# Patient Record
Sex: MALE | Race: WHITE | Employment: OTHER | URBAN - METROPOLITAN AREA
[De-identification: names, ages, dates, MRNs, and addresses within clinical notes are randomized per-mention and may not be internally consistent; named-entity substitution may affect disease eponyms.]

---

## 2020-10-09 ENCOUNTER — HOSPITAL ENCOUNTER (OUTPATIENT)
Dept: CARDIAC REHAB | Age: 75
Discharge: HOME | End: 2020-10-09
Payer: MEDICARE

## 2020-10-09 VITALS — BODY MASS INDEX: 31.32 KG/M2 | WEIGHT: 206 LBS

## 2020-10-09 PROCEDURE — 93797 PHYS/QHP OP CAR RHAB WO ECG: CPT

## 2020-10-09 PROCEDURE — 93798 PHYS/QHP OP CAR RHAB W/ECG: CPT

## 2020-10-09 RX ORDER — LISINOPRIL AND HYDROCHLOROTHIAZIDE 10; 12.5 MG/1; MG/1
1 TABLET ORAL DAILY
COMMUNITY
End: 2022-04-15 | Stop reason: DRUGHIGH

## 2020-10-09 NOTE — PROGRESS NOTES
INITIAL CARDIAC ITP FOR REVIEW AND SIGNATURE    Cardiac Rehab Initial Treatment Note/Plan    Bita Alexander 76 y.o. presented to cardiac rehab for an intake and a six minute walk test today with a primary diagnosis of CABG x4. Patient's EF is 38%. Bita Alexander has a history of Hypertension, Hyperlipidemia, Coronary artery disease, Obesity and status post CABG. Cardiac risk factors include smoking/ tobacco exposure, dyslipidemia, obesity, hypertension and these were reviewed with patient. Bita Alexander is  and lives with spouse. PHQ-9, depression score, is 4 and this is considered to benormal. The result was discussed with patient who confirms score to be accurate. Patient denied chest pain or SOB during 6 minute walk and the cardiac rhythm was in Sinus Tachycardia. Bita Alexander will attend exercise and educational sessions 3 days a week in cardiac rehab for 36 sessions. Goals for Rehab:    Patient name: Bita Alexander : 1945       Goals Comments   1.  Increase endurance so that he can play golf again   [x] initial  [] met                  [] not met  [] progressing Increase peak METS from 1.8 to 2.3 by next recert       Manisha Yost RN 10/9/2020 4:18 PM    Cardiac ITP      CR INTAKE from 10/9/2020 in [facility]    Treatment Diagnosis    Treatment Diagnosis 1  CABG    CABG Date  20    Treatment Diagnosis 2  NSTEMI    NSTEMI Date  20    Referral Date  10/08/20    Individual Treatment Plan    ITP Visit Type  Initial Assessment    1st Date of Exercise   10/09/20    ITP Next Review Date  20    Visit #/Total Visits      EF %  38 %    Risk Stratification  High    ITP  Exercise, Psychosocial, Tobacco, Nutrition, Education    Exercise     DASI Total Score  12.75    Assisted Devices  None    Total Score  0    Test  Six minute walk test    Exercise Prescription    Mode  Treadmill, Bike, Stepper, Ergometer    Frequency per week  2-3    Duration per session  35-55    Intensity  METS        1.8    RPE  11-13    Progression  increase peak METS to 3.0    Target Heart Rate      Resistance Training  No    Exercise Blood Pressures    Resting BP  104/61    Peak BP  126/80    Is BP WDL?   Yes    Exercise Activity at Home    Type  walking    Frequency  3-4    Duration  20 minutes    Resistance Training  No    Exercise Education    Education  Exercise safety, Signs/Symptoms to report, RPE scale, Equipment orientation    Exercise Target Goal    Target Goal(s)  Individual exercise RX, Aerobic activity 30 + minutes/day  5 days/week    Psychosocial    Stages of Change  Preparation    [facility] Total Score  18    PHQ 9 Score  4    Psychosocial Intervention    Interventions  No intervention indicated    Currently Taking Psychotropic Meds  No    Medication Changes  No    Psychosocial Education    Education  Impact self care behaviors on health    Psychosocial Target Goals    Target Goal(s)  Engages in self-care behaviors    Uses Stress Mgmt Techniques  Yes    Nutrition    Stages of Change  Preparation    Diabetes  No    Lipids    Date Lipids Drawn  09/04/20    Total  156    HDL  42    LDL  84    Triglycerides  150    Lipid Med(s)  Lipitor    Lipid Med Change(s)  No    Weight Management    Weight   93.4 kg (206 lb)    Waist Circumference   43    Alcohol  Weekly    Type  beer    Amount  1-2    Rate Your Plate Total Score  53    Nutrition Intervention    Dietitian Consult  No    Nurse/Patient Discussion  Yes    Nutrition Class  Yes    Diabetes Education Referral  No    Lipid Clinic Referral  No    Weight Management Referral  No    Nutrition Education    Education  Low sodium diet, Healthy eating, Carb-controlled diet, Low fat & cholesterol diet    Nutrition Target Goals    Target Goals  LDL-C less than 70 for high risk, BMI less than 25, Waist size less than 40 inches males and less than 35 inches for females, Weight loss of 5-10%    Education    Learning Barrier  Ready to learn    Education Intervention    Patient Education     Education  Risk factors, CAD, Med Compliance, Signs/Symptoms of Angina    Hypertension  Yes    Hypertension Controlled  Yes    Is BP WDL?   Yes    Med(s) Change  No    BP Meds  Lisinopril    Education Target Goals    Target Goals  Medication compliance, Understand target guidelines for lipids, Understand target guidelines for B/P, Risk factors    Physician Response    Exercise      CR INTAKE from 10/9/2020 [facility]    Any problems changes since your last visit  Other (comment)  [initial visit]    Any symptoms while exercising  denies    Psychosocial/Stress Level  0    Resting EKG rhythm  SR    Tobacco Use  None    ITP Next Review Date  11/08/20    Visit Number/Total Visits  1/36    What is plan for next session  start exercise Rx    On Call Medical Director Immediately Available  Lira    Target Heart Rate(Range)      Resting HR  81    Resting BP  104/61    Recovery HR  85    Recovery BP  111/75    Weight  93.4 kg (206 lb)    Exercise EKG Rhythm  SR/ST    Exercise Duration  6    Peak HR  101    Peak BP  126/80    Peak RPE  11    Peak Mets  1.8    Asymptomatic  yes    O2 Saturation  98    Total Minutes  6

## 2020-10-13 ENCOUNTER — HOSPITAL ENCOUNTER (OUTPATIENT)
Dept: CARDIAC REHAB | Age: 75
Discharge: HOME | End: 2020-10-13
Payer: MEDICARE

## 2020-10-13 VITALS — WEIGHT: 208 LBS | BODY MASS INDEX: 31.63 KG/M2

## 2020-10-13 PROCEDURE — 93798 PHYS/QHP OP CAR RHAB W/ECG: CPT

## 2020-10-14 ENCOUNTER — HOSPITAL ENCOUNTER (OUTPATIENT)
Dept: CARDIAC REHAB | Age: 75
Discharge: HOME | End: 2020-10-14
Payer: MEDICARE

## 2020-10-14 VITALS — BODY MASS INDEX: 31.17 KG/M2 | WEIGHT: 205 LBS

## 2020-10-14 PROCEDURE — 93798 PHYS/QHP OP CAR RHAB W/ECG: CPT

## 2020-10-16 ENCOUNTER — HOSPITAL ENCOUNTER (OUTPATIENT)
Dept: CARDIAC REHAB | Age: 75
Discharge: HOME | End: 2020-10-16
Payer: MEDICARE

## 2020-10-16 VITALS — WEIGHT: 205 LBS | BODY MASS INDEX: 31.17 KG/M2

## 2020-10-16 PROCEDURE — 93798 PHYS/QHP OP CAR RHAB W/ECG: CPT

## 2020-10-16 PROCEDURE — 93797 PHYS/QHP OP CAR RHAB WO ECG: CPT

## 2020-10-19 ENCOUNTER — HOSPITAL ENCOUNTER (OUTPATIENT)
Dept: CARDIAC REHAB | Age: 75
Discharge: HOME | End: 2020-10-19
Payer: MEDICARE

## 2020-10-26 ENCOUNTER — TELEPHONE (OUTPATIENT)
Dept: CARDIAC REHAB | Age: 75
End: 2020-10-26

## 2020-10-26 NOTE — TELEPHONE ENCOUNTER
Cardiac Rehab called patient and left a message to check in. Additional attempts at contact will be made.     Thank you,  Georgina Beltran

## 2020-10-29 ENCOUNTER — TELEPHONE (OUTPATIENT)
Dept: CARDIAC REHAB | Age: 75
End: 2020-10-29

## 2020-10-29 NOTE — TELEPHONE ENCOUNTER
Cardiac Rehab called patient and spoke to his wife, Mrs. Mark Valenzuela. She said patient still is having terrible foot pain and that the antibiotic has helped a little, but not much. Antibiotic runs out tomorrow and she is waiting for a call back as to what the next step is. She said patient is not scheduled to see the doctor again until 11/10/2020. She will call to update us when they have more information.     Thank you,  Jonathan Patterson

## 2020-10-30 ENCOUNTER — APPOINTMENT (OUTPATIENT)
Dept: CARDIAC REHAB | Age: 75
End: 2020-10-30
Payer: MEDICARE

## 2020-11-02 ENCOUNTER — APPOINTMENT (OUTPATIENT)
Dept: CARDIAC REHAB | Age: 75
End: 2020-11-02

## 2020-11-04 ENCOUNTER — APPOINTMENT (OUTPATIENT)
Dept: CARDIAC REHAB | Age: 75
End: 2020-11-04

## 2020-11-06 ENCOUNTER — APPOINTMENT (OUTPATIENT)
Dept: CARDIAC REHAB | Age: 75
End: 2020-11-06

## 2020-11-06 NOTE — PROGRESS NOTES
CARDIAC ITP REASSESSMENT FOR REVIEW AND SIGNATURE       Patient name: Efrain Pinto : 1945     Visits from Start of Care: 4      Reporting Period: 10/9 to     Subjective Reports: leg pain and swelling, hoping to be back in rehab soon   Goals Comments   1. Increase endurance so that he can play golf again      [] met                  [] not met  [x] progressing Increase peak METS from 4.9 to 5.0 by next recert      Key functional changes: none this recert      Problems/ barriers to goal attainment: leg swelling and pain, pt. has been out of rehab    Assessment / Recommendations:Continue w/ cardiac rehab when able to    Brittani Moon RN 2020 3:06 PM    Cardiac ITP      CR PHASE II from 10/16/2020 in [facility]    Treatment Diagnosis 1  CABG    CABG Date  20    Treatment Diagnosis 2  NSTEMI    NSTEMI Date  20    Referral Date  10/08/20    ITP Visit Type  Re-Assessment    1st Date of Exercise   10/09/20    ITP Next Review Date  20    Visit #/Total Visits      EF %  38 %    Risk Stratification  High    ITP  Exercise, Psychosocial, Tobacco, Nutrition, Education    Stages of Change  Maintenance    DASI Total Score      Assisted Devices  None    Total Score      Test      Mode  Treadmill, Bike, Stepper, Ergometer    Frequency per week  2-3    Duration per session  35-55    Intensity  METS        1.8-4.9    RPE  11-13    Progression  increase peak METS to 3.0    Target Heart Rate      Resistance Training  No    Resting BP  111/77    Peak BP  119/72    Is BP WDL?   Yes    Type  walking    Frequency  3-4    Duration  20 minutes    Resistance Training  No    Education  Exercise safety, Signs/Symptoms to report, RPE scale, Equipment orientation    Target Goal(s)  Individual exercise RX, Aerobic activity 30 + minutes/day  5 days/week    Stages of Change  Maintenance    [facility] Total Score      PHQ 9 Score      Interventions      Currently Taking Psychotropic Meds   Medication Changes  No    Education  Impact self care behaviors on health    Target Goal(s)  Engages in self-care behaviors    Uses Stress Mgmt Techniques  Yes    Stages of Change  Maintenance    Diabetes  No    Date Lipids Drawn  09/04/20    Total  156    HDL  42    LDL  84    Triglycerides  150    Lipid Med(s)  Lipitor    Lipid Med Change(s)  No    Weight   93 kg (205 lb)    Waist Circumference   43    Alcohol  Weekly    Type  beer    Amount  1-2    Rate Your Plate Total Score      Dietitian Consult  No    Nurse/Patient Discussion  Yes    Nutrition Class  Yes    Diabetes Education Referral  No    Lipid Clinic Referral  No    Weight Management Referral  No    Education      Target Goals  LDL-C less than 70 for high risk, BMI less than 25, Waist size less than 40 inches males and less than 35 inches for females, Weight loss of 5-10%    Learning Barrier  Ready to learn    Education  Risk factors, CAD, Med Compliance, Signs/Symptoms of Angina    Hypertension  Yes    Hypertension Controlled  Yes    Is BP WDL?   Yes    Med(s) Change  No    BP Meds  Lisinopril    Target Goals  Medication compliance, Understand target guidelines for lipids, Understand target guidelines for B/P, Risk factors    Exercise      CR PHASE II from 10/16/2020 in [facility]    Any problems changes since your last visit  Denies    Any symptoms while exercising  denies    Psychosocial/Stress Level  0    Resting EKG rhythm  SR    Tobacco Use  None    ITP Next Review Date  12/06/20    Visit Number/Total Visits  5/36  [education class (risky business)]    What is plan for next session      On Call Medical Director Immediately Available  Chary    Target Heart Rate(Range)      Resting HR  88    Resting BP  111/77    Recovery HR  81    Recovery BP  119/72    Weight  93 kg (205 lb)    Exercise EKG Rhythm  SR/ST w/rare PVCs    Exercise Duration  60    Peak HR  113    Peak BP      Peak RPE  15    Peak Mets  4.9    Asymptomatic  yes    O2 Saturation      Total Minutes  60

## 2020-11-09 ENCOUNTER — APPOINTMENT (OUTPATIENT)
Dept: CARDIAC REHAB | Age: 75
End: 2020-11-09

## 2020-11-11 ENCOUNTER — APPOINTMENT (OUTPATIENT)
Dept: CARDIAC REHAB | Age: 75
End: 2020-11-11

## 2020-11-13 ENCOUNTER — TELEPHONE (OUTPATIENT)
Dept: CARDIAC REHAB | Age: 75
End: 2020-11-13

## 2020-11-13 NOTE — TELEPHONE ENCOUNTER
Cardiac Rehab called patient and spoke to him about returning to rehab. He stated that he went to his PCP yesterday. According to patient, his PCP put him on medication for a gout flare up. Will follow up in about a week to see if medication helped with ankle swelling.     Thank you,  Dm Arias

## 2020-11-20 ENCOUNTER — APPOINTMENT (OUTPATIENT)
Dept: CARDIAC REHAB | Age: 75
End: 2020-11-20

## 2020-11-30 ENCOUNTER — TELEPHONE (OUTPATIENT)
Dept: CARDIAC REHAB | Age: 75
End: 2020-11-30

## 2020-11-30 NOTE — TELEPHONE ENCOUNTER
Cardiac Rehab received a call from patient's wife stating that he fell today. He is okay, but is going to see his doctor to make sure. He will be out this week but will return next week.     Thank you,  Roland Hawthoren

## 2020-12-01 ENCOUNTER — APPOINTMENT (OUTPATIENT)
Dept: CARDIAC REHAB | Age: 75
End: 2020-12-01
Payer: MEDICARE

## 2020-12-03 ENCOUNTER — APPOINTMENT (OUTPATIENT)
Dept: CARDIAC REHAB | Age: 75
End: 2020-12-03
Payer: MEDICARE

## 2020-12-04 NOTE — PROGRESS NOTES
CARDIAC ITP REASSESSMENT FOR REVIEW AND SIGNATURE         Patient name: Sunny Conner : 1945      Visits from Start of Care: 4                                        Reporting Period:  to      Subjective Reports: leg pain and swelling, hoping to be back in rehab soon        Goals Comments   1. Increase endurance so that he can play golf again       []? met                      []? not met  [x]? progressing Increase peak METS from 4.9 to 5.0 by next recert       Key functional changes: none this recert       Problems/ barriers to goal attainment: leg swelling and pain, pt. has been out of rehab, hoping to return next week     Assessment / Recommendations:Continue w/ cardiac rehab when able to     Bentley Jang RN 2020 8:10 AM     Cardiac ITP        CR PHASE II from 10/16/2020 in [facility]    Treatment Diagnosis 1  CABG    CABG Date  20    Treatment Diagnosis 2  NSTEMI    NSTEMI Date  20    Referral Date  10/08/20    ITP Visit Type  Re-Assessment    1st Date of Exercise   10/09/20    ITP Next Review Date  20    Visit #/Total Visits      EF %  38 %    Risk Stratification  High    ITP  Exercise, Psychosocial, Tobacco, Nutrition, Education    Stages of Change  Maintenance    DASI Total Score      Assisted Devices  None    Total Score      Test      Mode  Treadmill, Bike, Stepper, Ergometer    Frequency per week  2-3    Duration per session  35-55    Intensity  METS        1.8-4.9    RPE  11-13    Progression  increase peak METS to 3.0    Target Heart Rate      Resistance Training  No    Resting BP  111/77    Peak BP  119/72    Is BP WDL?   Yes    Type  walking    Frequency  3-4    Duration  20 minutes    Resistance Training  No    Education  Exercise safety, Signs/Symptoms to report, RPE scale, Equipment orientation    Target Goal(s)  Individual exercise RX, Aerobic activity 30 + minutes/day  5 days/week    Stages of Change  Maintenance    [facility] Score      PHQ 9 Score      Interventions      Currently Taking Psychotropic Meds      Medication Changes  No    Education  Impact self care behaviors on health    Target Goal(s)  Engages in self-care behaviors    Uses Stress Mgmt Techniques  Yes    Stages of Change  Maintenance    Diabetes  No    Date Lipids Drawn  09/04/20    Total  156    HDL  42    LDL  84    Triglycerides  150    Lipid Med(s)  Lipitor    Lipid Med Change(s)  No    Weight   93 kg (205 lb)    Waist Circumference   43    Alcohol  Weekly    Type  beer    Amount  1-2    Rate Your Plate Total Score      Dietitian Consult  No    Nurse/Patient Discussion  Yes    Nutrition Class  Yes    Diabetes Education Referral  No    Lipid Clinic Referral  No    Weight Management Referral  No    Education      Target Goals  LDL-C less than 70 for high risk, BMI less than 25, Waist size less than 40 inches males and less than 35 inches for females, Weight loss of 5-10%    Learning Barrier  Ready to learn    Education  Risk factors, CAD, Med Compliance, Signs/Symptoms of Angina    Hypertension  Yes    Hypertension Controlled  Yes    Is BP WDL?   Yes    Med(s) Change  No    BP Meds  Lisinopril    Target Goals  Medication compliance, Understand target guidelines for lipids, Understand target guidelines for B/P, Risk factors    Exercise        CR PHASE II from 10/16/2020 in [facility]    Any problems changes since your last visit  Denies    Any symptoms while exercising  denies    Psychosocial/Stress Level  0    Resting EKG rhythm  SR    Tobacco Use  None    ITP Next Review Date  12/06/20    Visit Number/Total Visits  5/36  [education class (risky business)]    What is plan for next session      On Call Medical Director Immediately Available  Chary    Target Heart Rate(Range)      Resting HR  88    Resting BP  111/77    Recovery HR  81    Recovery BP  119/72    Weight  93 kg (205 lb)    Exercise EKG Rhythm  SR/ST w/rare PVCs    Exercise Duration  60 Peak HR  113    Peak BP      Peak RPE  15    Peak Mets  4.9    Asymptomatic  yes    O2 Saturation      Total Minutes  60

## 2020-12-08 ENCOUNTER — TELEPHONE (OUTPATIENT)
Dept: CARDIAC REHAB | Age: 75
End: 2020-12-08

## 2020-12-08 NOTE — TELEPHONE ENCOUNTER
Cardiac Rehab called patient and left a message about his absences. Additional attempts at contact will be made.     Thank you,  Leann Kay

## 2020-12-10 ENCOUNTER — HOSPITAL ENCOUNTER (OUTPATIENT)
Dept: CARDIAC REHAB | Age: 75
Discharge: HOME | End: 2020-12-10
Payer: MEDICARE

## 2020-12-10 VITALS — WEIGHT: 198 LBS | BODY MASS INDEX: 30.11 KG/M2

## 2020-12-10 PROCEDURE — 93797 PHYS/QHP OP CAR RHAB WO ECG: CPT

## 2020-12-10 PROCEDURE — 93798 PHYS/QHP OP CAR RHAB W/ECG: CPT

## 2020-12-15 ENCOUNTER — HOSPITAL ENCOUNTER (OUTPATIENT)
Dept: CARDIAC REHAB | Age: 75
Discharge: HOME | End: 2020-12-15
Payer: MEDICARE

## 2020-12-15 VITALS — WEIGHT: 198 LBS | BODY MASS INDEX: 30.11 KG/M2

## 2020-12-15 PROCEDURE — 93798 PHYS/QHP OP CAR RHAB W/ECG: CPT

## 2020-12-17 ENCOUNTER — HOSPITAL ENCOUNTER (OUTPATIENT)
Dept: CARDIAC REHAB | Age: 75
Discharge: HOME | End: 2020-12-17
Payer: MEDICARE

## 2020-12-17 VITALS — BODY MASS INDEX: 30.11 KG/M2 | WEIGHT: 198 LBS

## 2020-12-17 PROCEDURE — 93798 PHYS/QHP OP CAR RHAB W/ECG: CPT

## 2020-12-22 ENCOUNTER — HOSPITAL ENCOUNTER (OUTPATIENT)
Dept: CARDIAC REHAB | Age: 75
Discharge: HOME | End: 2020-12-22
Payer: MEDICARE

## 2020-12-22 VITALS — WEIGHT: 200 LBS | BODY MASS INDEX: 30.41 KG/M2

## 2020-12-22 PROCEDURE — 93798 PHYS/QHP OP CAR RHAB W/ECG: CPT

## 2020-12-29 ENCOUNTER — HOSPITAL ENCOUNTER (OUTPATIENT)
Dept: CARDIAC REHAB | Age: 75
Discharge: HOME | End: 2020-12-29
Payer: MEDICARE

## 2020-12-29 VITALS — BODY MASS INDEX: 30.56 KG/M2 | WEIGHT: 201 LBS

## 2020-12-29 PROCEDURE — 93798 PHYS/QHP OP CAR RHAB W/ECG: CPT

## 2020-12-31 ENCOUNTER — HOSPITAL ENCOUNTER (OUTPATIENT)
Dept: CARDIAC REHAB | Age: 75
Discharge: HOME | End: 2020-12-31
Payer: MEDICARE

## 2020-12-31 VITALS — BODY MASS INDEX: 30.41 KG/M2 | WEIGHT: 200 LBS

## 2020-12-31 PROCEDURE — 93798 PHYS/QHP OP CAR RHAB W/ECG: CPT

## 2020-12-31 NOTE — PROGRESS NOTES
CARDIAC ITP REASSESSMENT FOR REVIEW AND [facility]        Patient name: Willian Newby DAVON: [birth year]      Visits from Start of Care: 11                                        Reporting Period: 12/4 to 12/31     Subjective Reports: leg pain and swelling has improved            Goals Comments   1. Increase endurance so that he can play golf again       []? ? met                      []?? not met  [x]? ? progressing Increase peak METS GTSP 6.5 GA 1.2 CJ next recert       Key functional changes: Patient increasing upper body strength. Pt. Has had lower extremity issues. Doing mainly upper body exercises.     Problems/ barriers to goal attainment: foot pain     Assessment / Recommendations:Continue w/cardiac rehab      Veronika Caba RN 12/31/2020 9:36 AM     Cardiac ITP     CR PHASE II from 12/29/2020 in [facility]   Treatment Diagnosis 1 CABG   CABG Date 09/08/20   Treatment Diagnosis 2 NSTEMI   NSTEMI Date 09/08/20   Referral Date 10/08/20   ITP Visit Type Re-Assessment   1st Date of Exercise  10/09/20   ITP Next Review Date 01/30/21   Visit #/Total Visits 11/36   EF % 38 %   Risk Stratification High   ITP Exercise, Psychosocial, Tobacco, Nutrition, Education   Stages of Change Maintenance   Assisted Devices None   Mode Treadmill, Bike, Stepper, Ergometer   Frequency per week 2-3   Duration per session 35-55   Intensity  METS       1.8-4.9   RPE 11-13   Progression increase peak METS to 3.0   Target Heart Rate    Resistance Training No   Resting /73   Peak /82   Is BP WDL?  Yes   Type walking   Frequency 3-4   Duration 20 minutes   Resistance Training No   Education Exercise safety, Signs/Symptoms to report, RPE scale, Equipment orientation   Target Goal(s) Individual exercise RX, Aerobic activity 30 + minutes/day  5 days/week   Stages of Change Maintenance   Interventions No intervention indicated   Currently Taking Psychotropic Meds No   Medication Changes No   Education Impact self care behaviors on health   Target Goal(s) Engages in self-care behaviors   Uses Stress Mgmt Techniques Yes   Stages of Change Maintenance   Diabetes No   Date Lipids Drawn 09/04/20   Total 156   HDL 42   LDL 84   Triglycerides 150   Lipid Med(s) Lipitor   Lipid Med Change(s) No   Weight  91.2 kg (201 lb)   Waist Circumference  43   Alcohol Weekly   Type beer   Amount 1-2   Dietitian Consult No   Nurse/Patient Discussion Yes   Nutrition Class Yes   Diabetes Education Referral No   Lipid Clinic Referral No   Weight Management Referral No   Education Low sodium diet, Low fat & cholesterol diet, Carb-controlled diet, Healthy eating   Target Goals LDL-C less than 70 for high risk, BMI less than 25, Waist size less than 40 inches males and less than 35 inches for females, Weight loss of 5-10%   Learning Barrier Ready to learn   Education Schedule Given Yes   Education Risk factors, CAD, Med Compliance, Signs/Symptoms of Angina   Hypertension Yes   Hypertension Controlled Yes   Is BP WDL?  Yes   Med(s) Change No   BP Meds Lisinopril   Target Goals Medication compliance, Understand target guidelines for lipids, Understand target guidelines for B/P, Risk factors   Exercise     CR PHASE II from 12/29/2020 [facility]   Any problems changes since your last visit Denies   Any symptoms while exercising Denies   Psychosocial/Stress Level 0   Resting EKG rhythm SR   Tobacco Use None   ITP Next Review Date 01/30/21   Visit Number/Total Visits 11/36   On Call Medical Director Immediately Available Beasley   Target Heart Rate(Range)    Resting HR 89   Resting /73   Recovery HR 82   Recovery /82   Weight 91.2 kg (201 lb)   Exercise EKG Rhythm SR-ST w/ occ PVCS   Exercise Duration 50   Peak    Peak RPE 15   Peak Mets 2.5   Asymptomatic yes   Total Minutes 65

## 2021-01-05 ENCOUNTER — HOSPITAL ENCOUNTER (OUTPATIENT)
Dept: CARDIAC REHAB | Age: 76
Discharge: HOME | End: 2021-01-05
Payer: MEDICARE

## 2021-01-05 VITALS — BODY MASS INDEX: 30.71 KG/M2 | WEIGHT: 202 LBS

## 2021-01-05 PROCEDURE — 93798 PHYS/QHP OP CAR RHAB W/ECG: CPT

## 2021-01-07 ENCOUNTER — HOSPITAL ENCOUNTER (OUTPATIENT)
Dept: CARDIAC REHAB | Age: 76
Discharge: HOME | End: 2021-01-07
Payer: MEDICARE

## 2021-01-07 VITALS — WEIGHT: 198 LBS | BODY MASS INDEX: 30.11 KG/M2

## 2021-01-07 PROCEDURE — 93798 PHYS/QHP OP CAR RHAB W/ECG: CPT

## 2021-01-12 ENCOUNTER — HOSPITAL ENCOUNTER (OUTPATIENT)
Dept: CARDIAC REHAB | Age: 76
Discharge: HOME | End: 2021-01-12
Payer: MEDICARE

## 2021-01-12 VITALS — WEIGHT: 199 LBS | BODY MASS INDEX: 30.26 KG/M2

## 2021-01-12 PROCEDURE — 93798 PHYS/QHP OP CAR RHAB W/ECG: CPT

## 2021-01-14 ENCOUNTER — HOSPITAL ENCOUNTER (OUTPATIENT)
Dept: CARDIAC REHAB | Age: 76
Discharge: HOME | End: 2021-01-14
Payer: MEDICARE

## 2021-01-14 VITALS — BODY MASS INDEX: 30.11 KG/M2 | WEIGHT: 198 LBS

## 2021-01-14 PROCEDURE — 93798 PHYS/QHP OP CAR RHAB W/ECG: CPT

## 2021-01-19 ENCOUNTER — HOSPITAL ENCOUNTER (OUTPATIENT)
Dept: CARDIAC REHAB | Age: 76
Discharge: HOME | End: 2021-01-19
Payer: MEDICARE

## 2021-01-19 VITALS — WEIGHT: 199 LBS | BODY MASS INDEX: 30.26 KG/M2

## 2021-01-19 PROCEDURE — 93798 PHYS/QHP OP CAR RHAB W/ECG: CPT

## 2021-01-21 ENCOUNTER — APPOINTMENT (OUTPATIENT)
Dept: CARDIAC REHAB | Age: 76
End: 2021-01-21
Payer: MEDICARE

## 2021-01-26 ENCOUNTER — HOSPITAL ENCOUNTER (OUTPATIENT)
Dept: CARDIAC REHAB | Age: 76
Discharge: HOME | End: 2021-01-26
Payer: MEDICARE

## 2021-01-26 VITALS — BODY MASS INDEX: 30.26 KG/M2 | WEIGHT: 199 LBS

## 2021-01-26 PROCEDURE — 93798 PHYS/QHP OP CAR RHAB W/ECG: CPT

## 2021-01-29 NOTE — PROGRESS NOTES
CARDIAC ITP REASSESSMENT FOR REVIEW AND [facility]        Patient name: Willian Schroeder : 1945      Visits from Start of Care: 18                                        Reporting Period:  to      Subjective Reports: leg pain and swelling has resolved            Goals Comments   1. Increase endurance so that he can play golf again       []? ?? met                      []? ?? not met  [x]? ?? progressing Increase peak METS MOKX 8.4 KB 1.0 UR next recert       Key functional changes: Patient increasing upper body strength. Pt. Is back to lower extremity exercises    Problems/ barriers to goal attainment: elbow pain     Assessment / Recommendations:Continue w/cardiac rehab      Dain Jimenez RN 2021 11:40 AM     Cardiac ITP     CR PHASE II from 2021 in [facility]   Treatment Diagnosis 1 CABG   CABG Date 20   Treatment Diagnosis 2 NSTEMI   NSTEMI Date 20   Referral Date 10/08/20   ITP Visit Type Re-Assessment   1st Date of Exercise  10/09/20   ITP Next Review Date 21   Visit #/Total Visits    EF % 38 %   Risk Stratification High   ITP Exercise, Psychosocial, Tobacco, Nutrition, Education   Stages of Change Maintenance   Assisted Devices None   Mode Treadmill, Bike, Stepper, Ergometer   Frequency per week 2-3   Duration per session 35-55   Intensity  METS       1.8-4.9   RPE 11-13   Progression increase peak METS to 3.0   Target Heart Rate    Resistance Training No   Resting /89   Peak /89   Is BP WDL?  Yes   Type walking   Frequency 3-4   Duration 20 minutes   Resistance Training No   Education Exercise safety, Signs/Symptoms to report, RPE scale, Equipment orientation   Target Goal(s) Individual exercise RX, Aerobic activity 30 + minutes/day  5 days/week   Stages of Change Maintenance   Medication Changes No   Education Impact self care behaviors on health   Target Goal(s) Engages in self-care behaviors   Uses Stress Mgmt Techniques Yes   Stages of Change Maintenance   Diabetes No   Date Lipids Drawn 09/04/20   Total 156   HDL 42   LDL 84   Triglycerides 150   Lipid Med(s) Lipitor   Lipid Med Change(s) No   Weight  90.3 kg (199 lb)   Height  5' 8\" (1.727 m)   BMI 30.32   Waist Circumference  43   Alcohol Weekly   Type beer   Amount 1-2   Dietitian Consult No   Nurse/Patient Discussion Yes   Nutrition Class Yes   Diabetes Education Referral No   Lipid Clinic Referral No   Weight Management Referral No   Education Low sodium diet, Low fat & cholesterol diet, Carb-controlled diet, Healthy eating   Target Goals LDL-C less than 70 for high risk, BMI less than 25, Waist size less than 40 inches males and less than 35 inches for females, Weight loss of 5-10%   Learning Barrier Ready to learn   Education Schedule Given Yes   Education Risk factors, CAD, Med Compliance, Signs/Symptoms of Angina   Hypertension Yes   Hypertension Controlled Yes   Is BP WDL?  Yes   Med(s) Change No   BP Meds Lisinopril   Target Goals Medication compliance, Understand target guidelines for lipids, Understand target guidelines for B/P, Risk factors   Exercise     CR PHASE II from 1/26/2021 in [place] [address]   Any problems changes since your last visit Other (comment)  [gout flare up]   Any symptoms while exercising SOB   Psychosocial/Stress Level 0   Resting EKG rhythm SR   Tobacco Use None   ITP Next Review Date 02/28/21   Visit Number/Total Visits 18/36   On Call Medical Director Immediately Available [facility]   Target Heart Rate(Range)    Resting HR 66   Resting /89   Recovery HR 70   Recovery /81   Weight 90.3 kg (199 lb)   Exercise EKG Rhythm ST w/PVCs   Exercise Duration 45   Peak    Peak RPE 15   Peak Mets 2.3   SOB 5/10   Total Minutes 55

## 2021-02-02 ENCOUNTER — HOSPITAL ENCOUNTER (OUTPATIENT)
Dept: CARDIAC REHAB | Age: 76
Discharge: HOME | End: 2021-02-02
Payer: MEDICARE

## 2021-02-02 VITALS — WEIGHT: 194 LBS | BODY MASS INDEX: 29.5 KG/M2

## 2021-02-02 PROCEDURE — 93798 PHYS/QHP OP CAR RHAB W/ECG: CPT

## 2021-02-04 ENCOUNTER — HOSPITAL ENCOUNTER (OUTPATIENT)
Dept: CARDIAC REHAB | Age: 76
Discharge: HOME | End: 2021-02-04
Payer: MEDICARE

## 2021-02-04 VITALS — WEIGHT: 192 LBS | BODY MASS INDEX: 29.19 KG/M2

## 2021-02-04 PROCEDURE — 93798 PHYS/QHP OP CAR RHAB W/ECG: CPT

## 2021-02-09 ENCOUNTER — HOSPITAL ENCOUNTER (OUTPATIENT)
Dept: CARDIAC REHAB | Age: 76
Discharge: HOME | End: 2021-02-09
Payer: MEDICARE

## 2021-02-09 VITALS — BODY MASS INDEX: 29.19 KG/M2 | WEIGHT: 192 LBS

## 2021-02-09 PROCEDURE — 93798 PHYS/QHP OP CAR RHAB W/ECG: CPT

## 2021-02-11 ENCOUNTER — HOSPITAL ENCOUNTER (OUTPATIENT)
Dept: CARDIAC REHAB | Age: 76
Discharge: HOME | End: 2021-02-11
Payer: MEDICARE

## 2021-02-11 VITALS — WEIGHT: 192 LBS | BODY MASS INDEX: 29.19 KG/M2

## 2021-02-11 PROCEDURE — 93798 PHYS/QHP OP CAR RHAB W/ECG: CPT

## 2021-02-17 ENCOUNTER — HOSPITAL ENCOUNTER (OUTPATIENT)
Dept: CARDIAC REHAB | Age: 76
Discharge: HOME | End: 2021-02-17
Payer: MEDICARE

## 2021-02-17 VITALS — BODY MASS INDEX: 29.65 KG/M2 | WEIGHT: 195 LBS

## 2021-02-17 PROCEDURE — 93798 PHYS/QHP OP CAR RHAB W/ECG: CPT

## 2021-02-18 ENCOUNTER — HOSPITAL ENCOUNTER (OUTPATIENT)
Dept: CARDIAC REHAB | Age: 76
Discharge: HOME | End: 2021-02-18
Payer: MEDICARE

## 2021-02-18 VITALS — WEIGHT: 194 LBS | BODY MASS INDEX: 29.5 KG/M2

## 2021-02-18 PROCEDURE — 93798 PHYS/QHP OP CAR RHAB W/ECG: CPT

## 2021-02-19 ENCOUNTER — APPOINTMENT (OUTPATIENT)
Dept: CARDIAC REHAB | Age: 76
End: 2021-02-19
Payer: MEDICARE

## 2021-02-23 ENCOUNTER — HOSPITAL ENCOUNTER (OUTPATIENT)
Dept: CARDIAC REHAB | Age: 76
Discharge: HOME | End: 2021-02-23
Payer: MEDICARE

## 2021-02-23 VITALS — WEIGHT: 193 LBS | BODY MASS INDEX: 29.35 KG/M2

## 2021-02-23 PROCEDURE — 93798 PHYS/QHP OP CAR RHAB W/ECG: CPT

## 2021-02-25 ENCOUNTER — HOSPITAL ENCOUNTER (OUTPATIENT)
Dept: CARDIAC REHAB | Age: 76
Discharge: HOME | End: 2021-02-25
Payer: MEDICARE

## 2021-02-25 VITALS — BODY MASS INDEX: 29.35 KG/M2 | WEIGHT: 193 LBS

## 2021-02-25 PROCEDURE — 93798 PHYS/QHP OP CAR RHAB W/ECG: CPT

## 2021-02-25 NOTE — PROGRESS NOTES
CARDIAC ITP REASSESSMENT FOR REVIEW AND [facility]        Patient name: Charles A Neal Halsted : 1945      Visits from Start of Care: 26                                        Reporting Period:  to      Subjective Reports: progressing well, no complaints            Goals Comments   1. Increase endurance so that he can play golf again       []???? met                      []???? not met  [x]???? progressing Increase peak METS IDDZ 0.5 CW 8.7 GW next recert       Key functional changes: Patient increasing upper body strength. Pt. Is back to lower extremity exercises     Problems/ barriers to goal attainment: None      Assessment / Recommendations:Continue w/cardiac rehab      Ashu Head RN 2021 4:36 PM     Cardiac ITP     CR PHASE II from 2021 in [facility]   Treatment Diagnosis 1 CABG   CABG Date 20   Treatment Diagnosis 2 NSTEMI   NSTEMI Date 20   Referral Date 10/08/20   ITP Visit Type Re-Assessment   1st Date of Exercise  10/09/20   ITP Next Review Date 21   Visit #/Total Visits 26/36   EF % 38 %   Risk Stratification High   ITP Exercise, Psychosocial, Tobacco, Nutrition, Education   Stages of Change Action   Assisted Devices None   Mode Treadmill, Bike, Stepper, Ergometer   Frequency per week 2-3   Duration per session 35-55   Intensity  METS       1.8-4.9   RPE 11-13   Progression increase peak METS to 3.0   Target Heart Rate    Resistance Training No   Resting /80   Peak /80   Is BP WDL?  Yes   Type walking   Frequency 3-4   Duration 20 minutes   Resistance Training No   Education Exercise safety, Signs/Symptoms to report, RPE scale, Equipment orientation   Target Goal(s) Individual exercise RX, Aerobic activity 30 + minutes/day  5 days/week   Stages of Change Action   Interventions No intervention indicated   Currently Taking Psychotropic Meds No   Medication Changes No   Education Impact self care behaviors on health   Target Goal(s) Engages in self-care behaviors   Uses Stress Mgmt Techniques Yes   Stages of Change Action   Diabetes No   Date Lipids Drawn 09/04/20   Total 156   HDL 42   LDL 84   Triglycerides 150   Lipid Med(s) Lipitor   Lipid Med Change(s) No   Weight  87.5 kg (193 lb)   Height  5' 8\" (1.727 m)   BMI 29.41   Waist Circumference  43   Alcohol Weekly   Type beer   Amount 1-2   Dietitian Consult No   Nurse/Patient Discussion Yes   Nutrition Class Yes   Diabetes Education Referral No   Lipid Clinic Referral No   Weight Management Referral No   Education Low fat & cholesterol diet, Carb-controlled diet, Low sodium diet, Healthy eating   Target Goals LDL-C less than 70 for high risk, BMI less than 25, Waist size less than 40 inches males and less than 35 inches for females, Weight loss of 5-10%   Learning Barrier Ready to learn   Education Schedule Given Yes   Education Risk factors, CAD, Med Compliance, Signs/Symptoms of Angina   Hypertension Yes   Hypertension Controlled Yes   Is BP WDL?  Yes   Med(s) Change No   BP Meds Lisinopril   Target Goals Medication compliance, Understand target guidelines for lipids, Understand target guidelines for B/P, Risk factors   Exercise     CR PHASE II from 2/25/2021 in [facility]   Any problems changes since your last visit Denies   Any symptoms while exercising denies   Psychosocial/Stress Level 0   Resting EKG rhythm SR   Tobacco Use None   ITP Next Review Date 03/25/21   Visit Number/Total Visits 26/36   On Call Medical Director Immediately Available Kailash   Target Heart Rate(Range)    Resting HR 91   Resting /80   Recovery    Recovery /75   Weight 87.5 kg (193 lb)   Exercise EKG Rhythm SR/ST w/PVCs   Exercise Duration 55   Peak    Peak RPE 15   Peak Mets 4.9   Asymptomatic yes   Total Minutes 65

## 2021-03-02 ENCOUNTER — HOSPITAL ENCOUNTER (OUTPATIENT)
Dept: CARDIAC REHAB | Age: 76
Discharge: HOME | End: 2021-03-02
Payer: MEDICARE

## 2021-03-02 VITALS — WEIGHT: 194 LBS | BODY MASS INDEX: 29.5 KG/M2

## 2021-03-02 PROCEDURE — 93798 PHYS/QHP OP CAR RHAB W/ECG: CPT

## 2021-03-04 ENCOUNTER — HOSPITAL ENCOUNTER (OUTPATIENT)
Dept: CARDIAC REHAB | Age: 76
Discharge: HOME | End: 2021-03-04
Payer: MEDICARE

## 2021-03-04 VITALS — WEIGHT: 193 LBS | BODY MASS INDEX: 29.35 KG/M2

## 2021-03-04 PROCEDURE — 93798 PHYS/QHP OP CAR RHAB W/ECG: CPT

## 2021-03-09 ENCOUNTER — HOSPITAL ENCOUNTER (OUTPATIENT)
Dept: CARDIAC REHAB | Age: 76
Discharge: HOME | End: 2021-03-09
Payer: MEDICARE

## 2021-03-09 VITALS — BODY MASS INDEX: 29.19 KG/M2 | WEIGHT: 192 LBS

## 2021-03-09 PROCEDURE — 93798 PHYS/QHP OP CAR RHAB W/ECG: CPT

## 2021-03-11 ENCOUNTER — HOSPITAL ENCOUNTER (OUTPATIENT)
Dept: CARDIAC REHAB | Age: 76
Discharge: HOME | End: 2021-03-11
Payer: MEDICARE

## 2021-03-11 VITALS — BODY MASS INDEX: 29.35 KG/M2 | WEIGHT: 193 LBS

## 2021-03-11 PROCEDURE — 93798 PHYS/QHP OP CAR RHAB W/ECG: CPT

## 2021-03-16 ENCOUNTER — HOSPITAL ENCOUNTER (OUTPATIENT)
Dept: CARDIAC REHAB | Age: 76
Discharge: HOME | End: 2021-03-16
Payer: MEDICARE

## 2021-03-16 VITALS — WEIGHT: 191 LBS | BODY MASS INDEX: 29.04 KG/M2

## 2021-03-16 PROCEDURE — 93798 PHYS/QHP OP CAR RHAB W/ECG: CPT

## 2021-03-18 ENCOUNTER — HOSPITAL ENCOUNTER (OUTPATIENT)
Dept: CARDIAC REHAB | Age: 76
Discharge: HOME | End: 2021-03-18
Payer: MEDICARE

## 2021-03-18 VITALS — BODY MASS INDEX: 29.19 KG/M2 | WEIGHT: 192 LBS

## 2021-03-18 PROCEDURE — 93798 PHYS/QHP OP CAR RHAB W/ECG: CPT

## 2021-03-23 ENCOUNTER — HOSPITAL ENCOUNTER (OUTPATIENT)
Dept: CARDIAC REHAB | Age: 76
Discharge: HOME | End: 2021-03-23
Payer: MEDICARE

## 2021-03-23 VITALS — BODY MASS INDEX: 29.19 KG/M2 | WEIGHT: 192 LBS

## 2021-03-23 PROCEDURE — 93798 PHYS/QHP OP CAR RHAB W/ECG: CPT

## 2021-03-23 PROCEDURE — 93797 PHYS/QHP OP CAR RHAB WO ECG: CPT

## 2021-03-25 ENCOUNTER — HOSPITAL ENCOUNTER (OUTPATIENT)
Dept: CARDIAC REHAB | Age: 76
Discharge: HOME | End: 2021-03-25
Payer: MEDICARE

## 2021-03-25 VITALS — BODY MASS INDEX: 29.35 KG/M2 | WEIGHT: 193 LBS

## 2021-03-25 PROCEDURE — 93798 PHYS/QHP OP CAR RHAB W/ECG: CPT

## 2021-03-26 NOTE — PROGRESS NOTES
CARDIAC ITP REASSESSMENT FOR REVIEW AND [facility]        Patient name: Willian Muro Serum : 1945      Visits from Start of Care: 35                                        Reporting Period:  to 3/26     Subjective Reports: progressing well, no complaints            Goals Comments   1. Increase endurance so that he can play golf again       []????? met                      []????? not met  [x]????? progressing Increase peak METS on the treadmill from 2.9 to 3.1 by next recert      Key functional changes: Patient has increased peak METS on the recumbent bike       Problems/ barriers to goal attainment: None      Assessment / Recommendations:Continue w/cardiac rehab      Kaylene Valiente RN 3/26/2021 9:32 AM     Cardiac ITP     CR PHASE II from 3/25/2021 in [facility]   Treatment Diagnosis 1 CABG   CABG Date 20   Treatment Diagnosis 2 NSTEMI   NSTEMI Date 20   Referral Date 10/08/20   ITP Visit Type Re-Assessment   1st Date of Exercise  10/09/20   ITP Next Review Date 21   Visit #/Total Visits 35/36   EF % 38 %   Risk Stratification High   ITP Exercise, Psychosocial, Tobacco, Nutrition, Education   Stages of Change Action   Assisted Devices None   Mode Treadmill, Bike, Stepper, Ergometer   Frequency per week 2-3   Duration per session 35-55   Intensity  METS       1.8-8.9   RPE 11-13   Progression increase peak METS to 3.0   Target Heart Rate    Resistance Training No   Resting /68   Peak /68   Is BP WDL?  Yes   Type walking   Frequency 3-4   Duration 20 minutes   Resistance Training No   Education Exercise safety, Signs/Symptoms to report, RPE scale, Equipment orientation   Target Goal(s) Individual exercise RX, Aerobic activity 30 + minutes/day  5 days/week   Stages of Change Action   Interventions No intervention indicated   Currently Taking Psychotropic Meds No   Medication Changes No   Education Impact self care behaviors on health   Target Goal(s) Engages in self-care behaviors   Uses Stress Mgmt Techniques Yes   Stages of Change Action   Diabetes No   Date Lipids Drawn 09/04/20   Total 156   HDL 42   LDL 84   Triglycerides 150   Lipid Med(s) Lipitor   Lipid Med Change(s) No   Weight  87.5 kg (193 lb)   Height  5' 8\" (1.727 m)   BMI 29.41   Waist Circumference  43   Alcohol Weekly   Type beer   Amount 1-2   Dietitian Consult No   Nurse/Patient Discussion Yes   Nutrition Class Yes   Diabetes Education Referral No   Lipid Clinic Referral No   Weight Management Referral No   Education Low fat & cholesterol diet, Carb-controlled diet, Low sodium diet, Healthy eating   Target Goals LDL-C less than 70 for high risk, BMI less than 25, Waist size less than 40 inches males and less than 35 inches for females, Weight loss of 5-10%   Learning Barrier Ready to learn   Education Schedule Given Yes   Education Risk factors, CAD, Med Compliance, Signs/Symptoms of Angina   Hypertension Yes   Hypertension Controlled Yes   Is BP WDL?  Yes   Med(s) Change No   BP Meds Lisinopril   Target Goals Medication compliance, Understand target guidelines for lipids, Understand target guidelines for B/P, Risk factors   Exercise     CR PHASE II from 3/25/2021 in [facility]   Any problems changes since your last visit Denies   Any symptoms while exercising denies   Psychosocial/Stress Level 0   Resting EKG rhythm SR   Tobacco Use None   ITP Next Review Date 04/24/21   Visit Number/Total Visits 35/36   On Call Medical Director Immediately Available Pankaj   Target Heart Rate(Range)    Resting HR 71   Resting /68   Recovery HR 89   Recovery BP 96/63   Weight 87.5 kg (193 lb)   Exercise EKG Rhythm SR/ST w/PVCs   Exercise Duration 57   Peak    Peak RPE 15   Peak Mets 8.9   Asymptomatic yes   Total Minutes 67

## 2021-03-30 ENCOUNTER — HOSPITAL ENCOUNTER (OUTPATIENT)
Dept: CARDIAC REHAB | Age: 76
Discharge: HOME | End: 2021-03-30
Payer: MEDICARE

## 2021-03-30 VITALS — BODY MASS INDEX: 29.35 KG/M2 | WEIGHT: 193 LBS

## 2021-03-30 PROCEDURE — 93798 PHYS/QHP OP CAR RHAB W/ECG: CPT

## 2021-03-31 NOTE — PROGRESS NOTES
Request for Additional Cardiac Rehab Visits        Patient name: Willian Wooten : 1945      Visits from Start of 433 [facility]    Patient has completed 36 sessions. From a medical necessity standpoint, the patient could benefit from extending his rehab due to a foot injury that hindered his progress during his first 36 sessions of rehab. Per Medicare, if medically necessary, a patient can qualify for an additional 36 sessions of cardiac rehab per cardiac event.       By cosigning this note, you are agreeing that patient would benefit from 36 more visits of cardiac rehab.

## 2021-04-01 ENCOUNTER — HOSPITAL ENCOUNTER (OUTPATIENT)
Dept: CARDIAC REHAB | Age: 76
Discharge: HOME | End: 2021-04-01
Payer: MEDICARE

## 2021-04-01 VITALS — BODY MASS INDEX: 29.35 KG/M2 | WEIGHT: 193 LBS

## 2021-04-01 PROCEDURE — 93798 PHYS/QHP OP CAR RHAB W/ECG: CPT

## 2021-04-06 ENCOUNTER — HOSPITAL ENCOUNTER (OUTPATIENT)
Dept: CARDIAC REHAB | Age: 76
Discharge: HOME | End: 2021-04-06
Payer: MEDICARE

## 2021-04-06 VITALS — BODY MASS INDEX: 29.5 KG/M2 | WEIGHT: 194 LBS

## 2021-04-06 PROCEDURE — 93798 PHYS/QHP OP CAR RHAB W/ECG: CPT

## 2021-04-08 ENCOUNTER — HOSPITAL ENCOUNTER (OUTPATIENT)
Dept: CARDIAC REHAB | Age: 76
Discharge: HOME | End: 2021-04-08
Payer: MEDICARE

## 2021-04-08 VITALS — BODY MASS INDEX: 29.35 KG/M2 | WEIGHT: 193 LBS

## 2021-04-08 PROCEDURE — 93798 PHYS/QHP OP CAR RHAB W/ECG: CPT

## 2021-04-13 ENCOUNTER — HOSPITAL ENCOUNTER (OUTPATIENT)
Dept: CARDIAC REHAB | Age: 76
Discharge: HOME | End: 2021-04-13
Payer: MEDICARE

## 2021-04-13 VITALS — WEIGHT: 193 LBS | BODY MASS INDEX: 29.35 KG/M2

## 2021-04-13 PROCEDURE — 93798 PHYS/QHP OP CAR RHAB W/ECG: CPT

## 2021-04-15 ENCOUNTER — HOSPITAL ENCOUNTER (OUTPATIENT)
Dept: CARDIAC REHAB | Age: 76
Discharge: HOME | End: 2021-04-15
Payer: MEDICARE

## 2021-04-15 VITALS — WEIGHT: 194 LBS | BODY MASS INDEX: 29.5 KG/M2

## 2021-04-15 PROCEDURE — 93798 PHYS/QHP OP CAR RHAB W/ECG: CPT

## 2021-04-20 ENCOUNTER — HOSPITAL ENCOUNTER (OUTPATIENT)
Dept: CARDIAC REHAB | Age: 76
Discharge: HOME | End: 2021-04-20
Payer: MEDICARE

## 2021-04-20 VITALS — BODY MASS INDEX: 29.04 KG/M2 | WEIGHT: 191 LBS

## 2021-04-20 PROCEDURE — 93798 PHYS/QHP OP CAR RHAB W/ECG: CPT

## 2021-04-22 ENCOUNTER — HOSPITAL ENCOUNTER (OUTPATIENT)
Dept: CARDIAC REHAB | Age: 76
Discharge: HOME | End: 2021-04-22
Payer: MEDICARE

## 2021-04-22 VITALS — BODY MASS INDEX: 29.04 KG/M2 | WEIGHT: 191 LBS

## 2021-04-22 PROCEDURE — 93798 PHYS/QHP OP CAR RHAB W/ECG: CPT

## 2021-04-23 NOTE — PROGRESS NOTES
CARDIAC ITP REASSESSMENT FOR REVIEW AND [facility]        Patient name: Willian Klein : 1945      Visits from Start of Care: 43                                       Reporting Period: 3/26 to      Subjective Reports: progressing well, no complaints            Goals Comments   1. Increase endurance so that he can play golf again       []?????? met                      []?????? not met  [x]?????? progressing Increase peak METS on the treadmill from 2.9 to 3.1 by next recert      Key functional changes: Patient has increased peak METS on the recumbent bike        Problems/ barriers to goal attainment: None      Assessment / Recommendations:Continue w/cardiac rehab      Jessica Smith RN 2021 3:57 PM     Cardiac ITP     CR PHASE II from 2021 in [facility]   Treatment Diagnosis 1 CABG   CABG Date 20   Treatment Diagnosis 2 NSTEMI   NSTEMI Date 20   Referral Date 10/08/20   ITP Visit Type Re-Assessment   1st Date of Exercise  10/09/20   ITP Next Review Date 21   Visit #/Total Visits 43/72   EF % 38 %   Risk Stratification High   ITP Exercise, Psychosocial, Tobacco, Nutrition, Education   Stages of Change Action   Assisted Devices None   Mode Treadmill, Bike, Stepper, Ergometer   Frequency per week 2-3   Duration per session 35-55   Intensity  METS       1.8-8.9   RPE 11-13   Progression increase peak METS to 3.0   Target Heart Rate    Resistance Training No   Resting /81   Peak /81   Is BP WDL?  Yes   Type walking   Frequency 3-4   Duration 20 minutes   Resistance Training No   Education Exercise safety, Signs/Symptoms to report, RPE scale, Equipment orientation   Target Goal(s) Individual exercise RX, Aerobic activity 30 + minutes/day  5 days/week   Stages of Change Action   Interventions No intervention indicated   Currently Taking Psychotropic Meds No   Medication Changes No   Education Impact self care behaviors on health   Target Goal(s) Engages in self-care behaviors   Uses Stress Mgmt Techniques Yes   Stages of Change Action   Diabetes No   Date Lipids Drawn 09/04/20   Total 156   HDL 42   LDL 84   Triglycerides 150   Lipid Med(s) Lipitor   Lipid Med Change(s) No   Weight  86.6 kg (191 lb)   Height  5' 8\" (1.727 m)   BMI 29.1   Waist Circumference  43   Alcohol Weekly   Type beer   Amount 1-2   Dietitian Consult No   Nurse/Patient Discussion Yes   Nutrition Class Yes   Diabetes Education Referral No   Lipid Clinic Referral No   Weight Management Referral No   Education Low fat & cholesterol diet, Carb-controlled diet, Low sodium diet, Healthy eating   Target Goals LDL-C less than 70 for high risk, BMI less than 25, Waist size less than 40 inches males and less than 35 inches for females, Weight loss of 5-10%   Learning Barrier Ready to learn   Education Schedule Given Yes   Education Risk factors, CAD, Med Compliance, Signs/Symptoms of Angina   Hypertension Yes   Hypertension Controlled Yes   Is BP WDL?  Yes   Med(s) Change No   BP Meds Lisinopril   Target Goals Medication compliance, Understand target guidelines for lipids, Understand target guidelines for B/P, Risk factors   Exercise     CR PHASE II from 4/22/2021 in [facility] 163   Any problems changes since your last visit Denies   Any symptoms while exercising Denies   Psychosocial/Stress Level 0   Resting EKG rhythm SR w/ occ PVCs   Tobacco Use None   ITP Next Review Date 04/24/21   Visit Number/Total Visits 43/72   On Call Medical Director Immediately Available Kailash   Target Heart Rate(Range)    Resting HR 80   Resting /81   Recovery HR 89   Recovery /82   Weight 86.6 kg (191 lb)   Exercise EKG Rhythm SR w/PVCs   Exercise Duration 50   Peak    Peak RPE 15   Peak Mets 8.9   Asymptomatic yes   Total Minutes 60

## 2021-04-27 ENCOUNTER — HOSPITAL ENCOUNTER (OUTPATIENT)
Dept: CARDIAC REHAB | Age: 76
Discharge: HOME | End: 2021-04-27
Payer: MEDICARE

## 2021-04-27 VITALS — WEIGHT: 190 LBS | BODY MASS INDEX: 28.89 KG/M2

## 2021-04-27 PROCEDURE — 93798 PHYS/QHP OP CAR RHAB W/ECG: CPT

## 2021-04-29 ENCOUNTER — HOSPITAL ENCOUNTER (OUTPATIENT)
Dept: CARDIAC REHAB | Age: 76
Discharge: HOME | End: 2021-04-29
Payer: MEDICARE

## 2021-04-29 VITALS — BODY MASS INDEX: 29.04 KG/M2 | WEIGHT: 191 LBS

## 2021-04-29 PROCEDURE — 93798 PHYS/QHP OP CAR RHAB W/ECG: CPT

## 2021-05-04 ENCOUNTER — HOSPITAL ENCOUNTER (OUTPATIENT)
Dept: CARDIAC REHAB | Age: 76
Discharge: HOME | End: 2021-05-04
Payer: MEDICARE

## 2021-05-04 VITALS — BODY MASS INDEX: 28.89 KG/M2 | WEIGHT: 190 LBS

## 2021-05-04 PROCEDURE — 93798 PHYS/QHP OP CAR RHAB W/ECG: CPT

## 2021-05-06 ENCOUNTER — HOSPITAL ENCOUNTER (OUTPATIENT)
Dept: CARDIAC REHAB | Age: 76
Discharge: HOME | End: 2021-05-06
Payer: MEDICARE

## 2021-05-06 VITALS — BODY MASS INDEX: 29.35 KG/M2 | WEIGHT: 193 LBS

## 2021-05-06 PROCEDURE — 93798 PHYS/QHP OP CAR RHAB W/ECG: CPT

## 2021-05-11 ENCOUNTER — HOSPITAL ENCOUNTER (OUTPATIENT)
Dept: CARDIAC REHAB | Age: 76
Discharge: HOME | End: 2021-05-11
Payer: MEDICARE

## 2021-05-11 VITALS — WEIGHT: 195 LBS | BODY MASS INDEX: 29.65 KG/M2

## 2021-05-11 PROCEDURE — 93798 PHYS/QHP OP CAR RHAB W/ECG: CPT

## 2021-05-13 ENCOUNTER — HOSPITAL ENCOUNTER (OUTPATIENT)
Dept: CARDIAC REHAB | Age: 76
Discharge: HOME | End: 2021-05-13
Payer: MEDICARE

## 2021-05-13 VITALS — WEIGHT: 194 LBS | BODY MASS INDEX: 29.5 KG/M2

## 2021-05-13 PROCEDURE — 93798 PHYS/QHP OP CAR RHAB W/ECG: CPT

## 2021-05-17 ENCOUNTER — HOSPITAL ENCOUNTER (OUTPATIENT)
Dept: CARDIAC REHAB | Age: 76
Discharge: HOME | End: 2021-05-17
Payer: MEDICARE

## 2021-05-17 VITALS — WEIGHT: 194 LBS | BODY MASS INDEX: 29.5 KG/M2

## 2021-05-17 PROCEDURE — 93798 PHYS/QHP OP CAR RHAB W/ECG: CPT

## 2021-05-20 ENCOUNTER — HOSPITAL ENCOUNTER (OUTPATIENT)
Dept: CARDIAC REHAB | Age: 76
Discharge: HOME | End: 2021-05-20
Payer: MEDICARE

## 2021-05-20 VITALS — BODY MASS INDEX: 29.35 KG/M2 | WEIGHT: 193 LBS

## 2021-05-20 PROCEDURE — 93798 PHYS/QHP OP CAR RHAB W/ECG: CPT

## 2021-05-21 NOTE — PROGRESS NOTES
CARDIAC ITP REASSESSMENT FOR REVIEW AND [facility]        Patient name: Willian Griffin : 1945      Visits from Start of Care: 51                                       Reporting Period:  to      Subjective Reports: progressing well, no complaints            Goals Comments   1. Increase endurance so that he can play golf again       []??????? met                      []??????? not met  [x]??????? progressing Increase peak METS on the treadmill from 2.9 to 3.1 by next recert      Key functional changes: Patient has increased peak METS on the recumbent bike to 8.9       Problems/ barriers to goal attainment: None      Assessment / Recommendations:Continue w/cardiac rehab      Vince Victoria RN 2021 9:40 AM     Cardiac ITP     CR PHASE II from 2021 in [facility]   Treatment Diagnosis 1 CABG   CABG Date 20   Treatment Diagnosis 2 NSTEMI   NSTEMI Date 20   Referral Date 10/08/20   ITP Visit Type Re-Assessment   1st Date of Exercise  10/09/20   ITP Next Review Date 21   Visit #/Total Visits 51/72   EF % 38 %   Risk Stratification High   ITP Exercise, Psychosocial, Tobacco, Nutrition, Education   Stages of Change Action   Assisted Devices None   Mode Treadmill, Bike, Stepper, Ergometer   Frequency per week 2-3   Duration per session 35-55   Intensity  METS       1.8-8.9   RPE 11-13   Progression increase peak METS to 3.0   Target Heart Rate    Resistance Training No   Resting BP 95/60   Peak /62   Is BP WDL?  Yes   Type walking   Frequency 3-4   Duration 20 minutes   Resistance Training No   Education Exercise safety, Signs/Symptoms to report, RPE scale, Equipment orientation   Target Goal(s) Individual exercise RX, Aerobic activity 30 + minutes/day  5 days/week   Stages of Change Action   Interventions No intervention indicated   Currently Taking Psychotropic Meds No   Medication Changes No   Education Impact self care behaviors on health   Target Goal(s) Engages in self-care behaviors   Uses Stress Mgmt Techniques Yes   Stages of Change Action   Diabetes No   Date Lipids Drawn 09/04/20   Total 156   HDL 42   LDL 84   Triglycerides 150   Lipid Med(s) Lipitor   Lipid Med Change(s) No   Weight  87.5 kg (193 lb)   Height  5' 8\" (1.727 m)   BMI 29.41   Waist Circumference  43   Alcohol Weekly   Type beer   Amount 1-2   Dietitian Consult No   Nurse/Patient Discussion Yes   Nutrition Class Yes   Diabetes Education Referral No   Lipid Clinic Referral No   Weight Management Referral No   Education Low fat & cholesterol diet, Carb-controlled diet, Low sodium diet, Healthy eating   Target Goals LDL-C less than 70 for high risk, BMI less than 25, Waist size less than 40 inches males and less than 35 inches for females, Weight loss of 5-10%   Learning Barrier Ready to learn   Education Schedule Given Yes   Education Risk factors, CAD, Med Compliance, Signs/Symptoms of Angina   Hypertension Yes   Hypertension Controlled Yes   Is BP WDL?  Yes   Med(s) Change No   BP Meds Lisinopril   Target Goals Medication compliance, Understand target guidelines for lipids, Understand target guidelines for B/P, Risk factors   Exercise     CR PHASE II from 5/20/2021 in [facility]29   Any problems changes since your last visit Denies   Any symptoms while exercising Denies   Psychosocial/Stress Level 0   Resting EKG rhythm SR w/ rare PVCs   Tobacco Use None   ITP Next Review Date 06/20/21   Visit Number/Total Visits 51/72   On Call Medical Director Immediately Available Kailash   Target Heart Rate(Range)    Resting HR 72   Resting BP 95/60   Recovery HR 83   Recovery /62   Weight 87.5 kg (193 lb)   Exercise EKG Rhythm SR/ST w/occ PVCs   Exercise Duration 68   Peak    Peak RPE 15   Peak Mets 5.5   Asymptomatic yes   Total Minutes 78

## 2021-05-25 ENCOUNTER — HOSPITAL ENCOUNTER (OUTPATIENT)
Dept: CARDIAC REHAB | Age: 76
Discharge: HOME | End: 2021-05-25
Payer: MEDICARE

## 2021-05-25 VITALS — BODY MASS INDEX: 29.19 KG/M2 | WEIGHT: 192 LBS

## 2021-05-25 PROCEDURE — 93798 PHYS/QHP OP CAR RHAB W/ECG: CPT

## 2021-05-27 ENCOUNTER — HOSPITAL ENCOUNTER (OUTPATIENT)
Dept: CARDIAC REHAB | Age: 76
Discharge: HOME | End: 2021-05-27
Payer: MEDICARE

## 2021-05-27 VITALS — BODY MASS INDEX: 29.5 KG/M2 | WEIGHT: 194 LBS

## 2021-05-27 PROCEDURE — 93798 PHYS/QHP OP CAR RHAB W/ECG: CPT

## 2021-06-01 ENCOUNTER — HOSPITAL ENCOUNTER (OUTPATIENT)
Dept: CARDIAC REHAB | Age: 76
Discharge: HOME | End: 2021-06-01
Payer: MEDICARE

## 2021-06-01 VITALS — BODY MASS INDEX: 29.8 KG/M2 | WEIGHT: 196 LBS

## 2021-06-01 PROCEDURE — 93798 PHYS/QHP OP CAR RHAB W/ECG: CPT

## 2021-06-08 ENCOUNTER — HOSPITAL ENCOUNTER (OUTPATIENT)
Dept: CARDIAC REHAB | Age: 76
Discharge: HOME | End: 2021-06-08
Payer: MEDICARE

## 2021-06-08 VITALS — BODY MASS INDEX: 29.8 KG/M2 | WEIGHT: 196 LBS

## 2021-06-08 PROCEDURE — 93798 PHYS/QHP OP CAR RHAB W/ECG: CPT

## 2021-06-15 ENCOUNTER — HOSPITAL ENCOUNTER (OUTPATIENT)
Dept: CARDIAC REHAB | Age: 76
Discharge: HOME | End: 2021-06-15
Payer: MEDICARE

## 2021-06-15 VITALS — WEIGHT: 194 LBS | BODY MASS INDEX: 29.5 KG/M2

## 2021-06-15 PROCEDURE — 93798 PHYS/QHP OP CAR RHAB W/ECG: CPT

## 2021-06-18 ENCOUNTER — HOSPITAL ENCOUNTER (OUTPATIENT)
Dept: CARDIAC REHAB | Age: 76
Discharge: HOME | End: 2021-06-18
Payer: MEDICARE

## 2021-06-18 VITALS — WEIGHT: 194 LBS | BODY MASS INDEX: 29.5 KG/M2

## 2021-06-18 PROCEDURE — 93798 PHYS/QHP OP CAR RHAB W/ECG: CPT

## 2021-06-18 NOTE — PROGRESS NOTES
CARDIAC ITP REASSESSMENT FOR REVIEW AND [facility]        Patient name: Willian Buitrago : 1945      Visits from Start of Care: 57                                       Reporting Period:  to      Subjective Reports: progressing well, no complaints            Goals Comments   1. Increase endurance so that he can play golf again       [x]???????? met                      []???????? not met  []???????? progressing Patient is now playing golf with a riding cart.       Key functional changes: Patient is now playing golf which was his initial goal     Problems/ barriers to goal attainment: None      Assessment / Recommendations:Continue w/cardiac rehab      Monica Walker RN 2021 4:34 PM       Cardiac ITP     CR PHASE II from 2021 in [facility]   Treatment Diagnosis 1 CABG   CABG Date 20   Treatment Diagnosis 2 NSTEMI   NSTEMI Date 20   Referral Date 10/08/20   ITP Visit Type Re-Assessment   1st Date of Exercise  10/09/20   ITP Next Review Date 21   Visit #/Total Visits 57/72   EF % 38 %   Risk Stratification High   ITP Exercise, Psychosocial, Tobacco, Nutrition, Education   Stages of Change Action   Assisted Devices None   Mode Treadmill, Bike, Stepper, Ergometer   Frequency per week 2-3   Duration per session 35-55   Intensity  METS       1.8-8.9   RPE 11-13   Progression increase peak METS to 3.0   Target Heart Rate    Resistance Training No   Resting /68   Peak /68   Is BP WDL?  Yes   Type walking   Frequency 3-4   Duration 20 minutes   Resistance Training No   Education Exercise safety, Signs/Symptoms to report, RPE scale, Equipment orientation   Target Goal(s) Individual exercise RX, Aerobic activity 30 + minutes/day  5 days/week   Stages of Change Action   Interventions No intervention indicated   Currently Taking Psychotropic Meds No   Medication Changes No   Education Impact self care behaviors on health   Target Goal(s) Engages in self-care behaviors   Uses Stress Mgmt Techniques Yes   Stages of Change Action   Diabetes No   Date Lipids Drawn 09/04/20   Total 156   HDL 42   LDL 84   Triglycerides 150   Lipid Med(s) Lipitor   Lipid Med Change(s) No   Weight  88 kg (194 lb)   Height  5' 8\" (1.727 m)   BMI 29.56   Waist Circumference  43   Alcohol Weekly   Type beer   Amount 1-2   Dietitian Consult No   Nurse/Patient Discussion Yes   Nutrition Class Yes   Diabetes Education Referral No   Lipid Clinic Referral No   Weight Management Referral No   Education Low fat & cholesterol diet, Carb-controlled diet, Low sodium diet, Healthy eating   Target Goals LDL-C less than 70 for high risk, BMI less than 25, Waist size less than 40 inches males and less than 35 inches for females, Weight loss of 5-10%   Learning Barrier Ready to learn   Education Schedule Given Yes   Education Risk factors, CAD, Med Compliance, Signs/Symptoms of Angina   Hypertension Yes   Hypertension Controlled Yes   Is BP WDL?  Yes   Med(s) Change No   BP Meds Lisinopril   Target Goals Medication compliance, Understand target guidelines for lipids, Understand target guidelines for B/P, Risk factors   Exercise     CR PHASE II from 6/15/2021 in [facility] [place] [facility]34   Any problems changes since your last visit Denies   Any symptoms while exercising denies   Psychosocial/Stress Level 0   Resting EKG rhythm SR w/rare PVCs   Tobacco Use None   ITP Next Review Date 06/20/21   Visit Number/Total Visits 56/72   On Call Medical Director Immediately Available Spenser Moran   Target Heart Rate(Range)    Resting HR 66   Resting /65   Recovery HR 82   Recovery /72   Weight 88 kg (194 lb)   Exercise EKG Rhythm SR w/rare PVCs   Exercise Duration 53   Peak    Peak RPE 15   Peak Mets 2.9   Asymptomatic yes   Total Minutes 63

## 2021-06-22 ENCOUNTER — APPOINTMENT (OUTPATIENT)
Dept: CARDIAC REHAB | Age: 76
End: 2021-06-22
Payer: MEDICARE

## 2021-06-24 ENCOUNTER — APPOINTMENT (OUTPATIENT)
Dept: CARDIAC REHAB | Age: 76
End: 2021-06-24
Payer: MEDICARE

## 2021-06-29 ENCOUNTER — HOSPITAL ENCOUNTER (OUTPATIENT)
Dept: CARDIAC REHAB | Age: 76
Discharge: HOME | End: 2021-06-29
Payer: MEDICARE

## 2021-06-29 NOTE — PROGRESS NOTES
Pt. Came into cardiac rehab today hypotensive. BP readings at rest: 83/52, 86/55, and 92/58. Pt. Recently started taking on spironalactone. Pt. Alert and oriented x4 and denied all symptoms including chest pain, shortness of breath, and dizziness. Called pts. spouse and updated her of the blood pressure. Pt. Wife stated that she would call the pt. Cardiology office about recent medication change and BP. Patient did not exercise today and was able to leave rehab unassisted.

## 2021-07-02 ENCOUNTER — HOSPITAL ENCOUNTER (OUTPATIENT)
Dept: CARDIAC REHAB | Age: 76
Discharge: HOME | End: 2021-07-02
Payer: MEDICARE

## 2021-07-02 VITALS — BODY MASS INDEX: 29.95 KG/M2 | WEIGHT: 197 LBS

## 2021-07-02 PROCEDURE — 93798 PHYS/QHP OP CAR RHAB W/ECG: CPT

## 2021-07-06 ENCOUNTER — HOSPITAL ENCOUNTER (OUTPATIENT)
Dept: CARDIAC REHAB | Age: 76
Discharge: HOME | End: 2021-07-06
Payer: MEDICARE

## 2021-07-06 VITALS — BODY MASS INDEX: 29.5 KG/M2 | WEIGHT: 194 LBS

## 2021-07-06 PROCEDURE — 93797 PHYS/QHP OP CAR RHAB WO ECG: CPT

## 2021-07-06 PROCEDURE — 93798 PHYS/QHP OP CAR RHAB W/ECG: CPT

## 2021-07-06 NOTE — PROGRESS NOTES
Daily Progress Note- Nonmonitored Exercise Session    Janine Wynne is a 76y.o. 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 He is a patient in cardiac rehab that was unable to be monitored for his full session on Telemetry due to [facility] telemetry system shutting down during his session and failure to save data. Only 7 minutes 36 seconds of rhythm strip was saved. During session, patient used the following modalities.        Modality     MET Level   # Minutes:      HR   []   Warm-up          [x]  Arm Ergometer      Curran: 20    2.5 15 102   [x]  Treadmill Speed: 2.5   Grade:    2.9 30   100   [x]  Recumbent Bike     Level:    RPM:   5.5 15 107   [x]  Free Weights       LBS: 10   Reps/Sets  12 reps/3 sets   109   Recovery & Monitoring                        Electronically signed by Sheyla Rollins RN

## 2021-07-09 ENCOUNTER — HOSPITAL ENCOUNTER (OUTPATIENT)
Dept: CARDIAC REHAB | Age: 76
Discharge: HOME | End: 2021-07-09
Payer: MEDICARE

## 2021-07-09 VITALS — BODY MASS INDEX: 29.5 KG/M2 | WEIGHT: 194 LBS

## 2021-07-09 PROCEDURE — 93798 PHYS/QHP OP CAR RHAB W/ECG: CPT

## 2021-07-13 ENCOUNTER — HOSPITAL ENCOUNTER (OUTPATIENT)
Dept: CARDIAC REHAB | Age: 76
Discharge: HOME | End: 2021-07-13
Payer: MEDICARE

## 2021-07-13 VITALS — BODY MASS INDEX: 29.65 KG/M2 | WEIGHT: 195 LBS

## 2021-07-13 PROCEDURE — 93798 PHYS/QHP OP CAR RHAB W/ECG: CPT

## 2021-07-16 NOTE — PROGRESS NOTES
CARDIAC ITP REASSESSMENT FOR REVIEW AND [facility]        Patient name: Willian Buitrago : 1945      Visits from Start of Care: 61                                      Reporting Period:  to      Subjective Reports: progressing well, no complaints            Goals Comments   1. Increase endurance so that he can play golf again       [x]????????? met                      []????????? not met  []????????? progressing Patient is now playing golf with a riding cart.       Key functional changes: Patient is now playing golf which was his initial goal     Problems/ barriers to goal attainment: None      Assessment / Recommendations:Continue w/cardiac rehab      Nita Rodriguez RN 2021 1:11 PM           Cardiac ITP     CR PHASE II from 2021 in [facility]   Treatment Diagnosis 1 CABG   CABG Date 20   Treatment Diagnosis 2 NSTEMI   NSTEMI Date 20   Referral Date 10/08/20   ITP Visit Type Re-Assessment   1st Date of Exercise  10/09/20   ITP Next Review Date 08/15/21   Visit #/Total Visits 61/72   EF % 38 %   Risk Stratification High   ITP Exercise, Psychosocial, Tobacco, Nutrition, Education   Stages of Change Action   Assisted Devices None   Mode Treadmill, Bike, Stepper, Ergometer   Frequency per week 2-3   Duration per session 35-55   Intensity  METS       1.8-8.9   RPE 11-13   Progression increase peak METS to 3.0   Target Heart Rate    Resistance Training No   Resting /61   Peak /71   Is BP WDL?  Yes   Type walking   Frequency 3-4   Duration 20 minutes   Resistance Training No   Education Exercise safety, Signs/Symptoms to report, RPE scale, Equipment orientation   Target Goal(s) Individual exercise RX, Aerobic activity 30 + minutes/day  5 days/week   Stages of Change Action   Interventions No intervention indicated   Currently Taking Psychotropic Meds No   Medication Changes No   Education Impact self care behaviors on health   Target Goal(s) Engages in self-care behaviors   Uses Stress Mgmt Techniques Yes   Stages of Change Action   Diabetes No   Date Lipids Drawn 09/04/20   Total 156   HDL 42   LDL 84   Triglycerides 150   Lipid Med(s) Lipitor   Lipid Med Change(s) No   Weight  88.5 kg (195 lb)   Height  5' 8\" (1.727 m)   BMI 29.71   Waist Circumference  43   Alcohol Weekly   Type beer   Amount 1-2   Dietitian Consult No   Nurse/Patient Discussion Yes   Nutrition Class Yes   Diabetes Education Referral No   Lipid Clinic Referral No   Weight Management Referral No   Education Low fat & cholesterol diet, Carb-controlled diet, Low sodium diet, Healthy eating   Target Goals LDL-C less than 70 for high risk, BMI less than 25, Waist size less than 40 inches males and less than 35 inches for females, Weight loss of 5-10%   Learning Barrier Ready to learn   Education Schedule Given Yes   Education Risk factors, CAD, Med Compliance, Signs/Symptoms of Angina   Hypertension Yes   Hypertension Controlled Yes   Is BP WDL?  Yes   Med(s) Change No   BP Meds Lisinopril   Target Goals Medication compliance, Understand target guidelines for lipids, Understand target guidelines for B/P, Risk factors   Exercise     CR PHASE II from 7/13/2021 in [facility]   Any problems changes since your last visit Denies   Any symptoms while exercising denies   Psychosocial/Stress Level 0   Resting EKG rhythm SR w/occ PVCs   Tobacco Use None   ITP Next Review Date 08/15/21   Visit Number/Total Visits 61/72   On Call Medical Director Immediately Available Kailash   Target Heart Rate(Range)    Resting HR 76   Resting /61   Recovery HR 78   Recovery /71   Weight 88.5 kg (195 lb)   Exercise EKG Rhythm SR w/occ PVCs   Exercise Duration 60   Peak    Peak RPE 15   Peak Mets 5.4   Asymptomatic yes   Total Minutes 70

## 2021-07-20 ENCOUNTER — HOSPITAL ENCOUNTER (OUTPATIENT)
Dept: CARDIAC REHAB | Age: 76
Discharge: HOME | End: 2021-07-20
Payer: MEDICARE

## 2021-07-20 VITALS — BODY MASS INDEX: 29.65 KG/M2 | WEIGHT: 195 LBS

## 2021-07-20 PROCEDURE — 93798 PHYS/QHP OP CAR RHAB W/ECG: CPT

## 2021-07-22 ENCOUNTER — HOSPITAL ENCOUNTER (OUTPATIENT)
Dept: CARDIAC REHAB | Age: 76
Discharge: HOME | End: 2021-07-22
Payer: MEDICARE

## 2021-07-22 VITALS — WEIGHT: 193 LBS | BODY MASS INDEX: 29.35 KG/M2

## 2021-07-22 PROCEDURE — 93798 PHYS/QHP OP CAR RHAB W/ECG: CPT

## 2021-07-27 ENCOUNTER — HOSPITAL ENCOUNTER (OUTPATIENT)
Dept: CARDIAC REHAB | Age: 76
Discharge: HOME | End: 2021-07-27
Payer: MEDICARE

## 2021-07-27 VITALS — BODY MASS INDEX: 29.65 KG/M2 | WEIGHT: 195 LBS

## 2021-07-27 PROCEDURE — 93798 PHYS/QHP OP CAR RHAB W/ECG: CPT

## 2021-07-29 ENCOUNTER — HOSPITAL ENCOUNTER (OUTPATIENT)
Dept: CARDIAC REHAB | Age: 76
Discharge: HOME | End: 2021-07-29
Payer: MEDICARE

## 2021-07-29 NOTE — PROGRESS NOTES
Pt. Came into cardiac rehab today with low blood pressure. Pt. Sinus rhythm with HR of 66 on telemetry. Pt. Was asymptomatic. Had patient drink a cup of water in between each BP check. BP's checked every 5 minutes. BP readings were 89/57, 87/60, 90/61, 82/60, 90/61, and 96/66. Advised pt. Not to exercise today. Called patient's cardiology office and attempted to speak to his cardiologist's RN. No answer from the office and voicemail left. Called pt. Wife to update her of pt. Low BP. Explained to patient to keep hydrated and will update pt. If cardiologist office calls back. Pt. Was asymptomatic and was able to leave cardiac rehab unassisted.

## 2021-08-03 ENCOUNTER — HOSPITAL ENCOUNTER (OUTPATIENT)
Dept: CARDIAC REHAB | Age: 76
Discharge: HOME | End: 2021-08-03
Payer: MEDICARE

## 2021-08-03 VITALS — WEIGHT: 194 LBS | BODY MASS INDEX: 29.5 KG/M2

## 2021-08-03 PROCEDURE — 93798 PHYS/QHP OP CAR RHAB W/ECG: CPT

## 2021-08-05 ENCOUNTER — HOSPITAL ENCOUNTER (OUTPATIENT)
Dept: CARDIAC REHAB | Age: 76
Discharge: HOME | End: 2021-08-05
Payer: MEDICARE

## 2021-08-05 VITALS — WEIGHT: 193 LBS | BODY MASS INDEX: 29.35 KG/M2

## 2021-08-05 PROCEDURE — 93798 PHYS/QHP OP CAR RHAB W/ECG: CPT

## 2021-08-06 NOTE — PROGRESS NOTES
CARDIAC ITP REASSESSMENT FOR REVIEW AND [facility] [address]        Patient name: Willian Buitrago : 1945      Visits from Start of Care: 66                                      Reporting Period:  to      Subjective Reports: progressing well, no complaints            Goals Comments   1. Increase endurance so that he can play golf again       [x]?????????? met                      []?????????? not met  []?????????? progressing Patient is now playing golf with a riding cart.       Key functional changes: Patient is now playing golf which was his initial goal     Problems/ barriers to goal attainment: None      Assessment / Recommendations:Continue w/cardiac rehab      Gladis Randolph RN 2021 4:57 PM      Cardiac ITP     CR PHASE II from 2021 in [facility]   Treatment Diagnosis 1 CABG   CABG Date 21   Treatment Diagnosis 2 NSTEMI   NSTEMI Date 21   Referral Date 10/08/21   ITP Visit Type Re-Assessment   1st Date of Exercise  10/09/20   ITP Next Review Date 21   Visit #/Total Visits 66/72   EF % 35 %   Risk Stratification High   ITP Exercise, Psychosocial, Tobacco, Nutrition, Education   Stages of Change Action   Assisted Devices None   Mode Treadmill, Bike, Stepper, Ergometer   Frequency per week 2-3   Duration per session 35-55   Intensity  METS       1.8-8.9   RPE 11-13   Progression increase peak METS to 3.0   Target Heart Rate    Resistance Training No   Resting /68   Peak /65   Is BP WDL?  Yes   Type walking   Frequency 3-4   Duration 20 minutes   Resistance Training No   Education Exercise safety, Signs/Symptoms to report, RPE scale, Equipment orientation   Target Goal(s) Individual exercise RX, Aerobic activity 30 + minutes/day  5 days/week   Stages of Change Action   Interventions No intervention indicated   Currently Taking Psychotropic Meds No   Medication Changes No   Education Impact self care behaviors on health   Target Goal(s) Engages in self-care behaviors   Uses Stress Mgmt Techniques Yes   Stages of Change Action   Diabetes No   Date Lipids Drawn 09/04/20   Total 156   HDL 42   LDL 84   Triglycerides 150   Lipid Med(s) Lipitor   Lipid Med Change(s) No   Weight  87.5 kg (193 lb)   Height  5' 8\" (1.727 m)   BMI 29.41   Waist Circumference  43   Alcohol Weekly   Type beer   Amount 1-2   Dietitian Consult No   Nurse/Patient Discussion Yes   Nutrition Class Yes   Diabetes Education Referral No   Lipid Clinic Referral No   Weight Management Referral No   Education Low fat & cholesterol diet, Carb-controlled diet, Low sodium diet, Healthy eating   Target Goals LDL-C less than 70 for high risk, BMI less than 25, Waist size less than 40 inches males and less than 35 inches for females, Weight loss of 5-10%   Learning Barrier Ready to learn   Education Schedule Given Yes   Education Risk factors, CAD, Med Compliance, Signs/Symptoms of Angina   Hypertension Yes   Hypertension Controlled Yes   Is BP WDL?  Yes   Med(s) Change No   BP Meds Lisinopril   Target Goals Medication compliance, Understand target guidelines for lipids, Understand target guidelines for B/P, Risk factors   Exercise     CR PHASE II from 8/5/2021 in [facility]34   Any problems changes since your last visit Denies   Any symptoms while exercising denies   Psychosocial/Stress Level 0   Resting EKG rhythm SR   Tobacco Use None   ITP Next Review Date 09/05/21   Visit Number/Total Visits 66/72   On Call Medical Director Immediately Available Ramos   Target Heart Rate(Range)    Resting HR 73   Resting /68   Recovery HR 71   Recovery /65   Weight 87.5 kg (193 lb)   Exercise EKG Rhythm SR/ST w/rare PVCs   Exercise Duration 73   Peak    Peak RPE 15   Peak Mets 6.2   Asymptomatic yes   Total Minutes 83

## 2021-08-10 ENCOUNTER — HOSPITAL ENCOUNTER (OUTPATIENT)
Dept: CARDIAC REHAB | Age: 76
Discharge: HOME | End: 2021-08-10
Payer: MEDICARE

## 2021-08-10 VITALS — WEIGHT: 193 LBS | BODY MASS INDEX: 29.35 KG/M2

## 2021-08-10 PROCEDURE — 93798 PHYS/QHP OP CAR RHAB W/ECG: CPT

## 2021-08-12 ENCOUNTER — HOSPITAL ENCOUNTER (OUTPATIENT)
Dept: CARDIAC REHAB | Age: 76
Discharge: HOME | End: 2021-08-12
Payer: MEDICARE

## 2021-08-12 VITALS — BODY MASS INDEX: 29.5 KG/M2 | WEIGHT: 194 LBS

## 2021-08-12 PROCEDURE — 93798 PHYS/QHP OP CAR RHAB W/ECG: CPT

## 2021-08-17 ENCOUNTER — HOSPITAL ENCOUNTER (OUTPATIENT)
Dept: CARDIAC REHAB | Age: 76
Discharge: HOME | End: 2021-08-17
Payer: MEDICARE

## 2021-08-17 VITALS — WEIGHT: 196 LBS | BODY MASS INDEX: 29.8 KG/M2

## 2021-08-17 PROCEDURE — 93798 PHYS/QHP OP CAR RHAB W/ECG: CPT

## 2021-08-19 ENCOUNTER — HOSPITAL ENCOUNTER (OUTPATIENT)
Dept: CARDIAC REHAB | Age: 76
Discharge: HOME | End: 2021-08-19
Payer: MEDICARE

## 2021-08-19 VITALS — BODY MASS INDEX: 29.5 KG/M2 | WEIGHT: 194 LBS

## 2021-08-19 PROCEDURE — 93798 PHYS/QHP OP CAR RHAB W/ECG: CPT

## 2021-08-20 ENCOUNTER — APPOINTMENT (OUTPATIENT)
Dept: CARDIAC REHAB | Age: 76
End: 2021-08-20
Payer: MEDICARE

## 2021-08-24 ENCOUNTER — HOSPITAL ENCOUNTER (OUTPATIENT)
Dept: CARDIAC REHAB | Age: 76
Discharge: HOME | End: 2021-08-24
Payer: MEDICARE

## 2021-08-24 VITALS — WEIGHT: 197 LBS | BODY MASS INDEX: 29.95 KG/M2

## 2021-08-24 PROCEDURE — 93798 PHYS/QHP OP CAR RHAB W/ECG: CPT

## 2021-08-26 ENCOUNTER — HOSPITAL ENCOUNTER (OUTPATIENT)
Dept: CARDIAC REHAB | Age: 76
Discharge: HOME | End: 2021-08-26
Payer: MEDICARE

## 2021-08-26 VITALS — WEIGHT: 197 LBS | BODY MASS INDEX: 29.95 KG/M2

## 2021-08-26 PROCEDURE — 93798 PHYS/QHP OP CAR RHAB W/ECG: CPT

## 2021-08-26 NOTE — PROGRESS NOTES
COMPLETE CARDIAC REHAB DISCHARGE FOR REVIEW AND SIGNATURE    Darlene Camp has completed phase II cardiac rehab and attended 72 sessions from 10/9/2020- 8/26/2021. Darlene Camp is interested in maintaining optimal health and will work with Maciel Torres MD.  He has improved his endurance and stamina through regular exercise during the program. He lost 9 lbs and 1 inch from his waist. Blood pressure is 107/63 and is WNL. He has also improved his Rate Your Plate and DASI and these were reviewed with patient. His MET level increased on his six minute walk test.     Darlene Camp has met his recert goals and plans to continue exercising (at home, gym, etc). He has set revised goals that include cardio equipment, light weights and walking 3 to 5 times a week. Neetu Carmona RN  8/26/2021     Cardiac ITP     CR PHASE II from 8/26/2021 in [place] [address]   Treatment Diagnosis 1 CABG   CABG Date 09/08/21   Treatment Diagnosis 2 NSTEMI   NSTEMI Date 09/08/21   Referral Date 10/08/21   ITP Visit Type Discharge, completed program   1st Date of Exercise  10/09/20   ITP Next Review Date 09/05/21   Visit #/Total Visits 72/72   EF % 35 %   Risk Stratification High   ITP Exercise, Psychosocial, Tobacco, Nutrition, Education   Stages of Change Maintenance   DASI Total Score 24.95   Assisted Devices None   Test Six minute walk test   Mode Treadmill, Bike, Stepper, Ergometer   Frequency per week 2-3   Duration per session 35-55   Intensity  METS       1.8-8.9   RPE 11-13   Progression increase peak METS to 3.0   Target Heart Rate    Resistance Training No   Resting /63   Peak /73   Is BP WDL?  Yes   Type walking   Frequency 3-4   Duration 30 minutes   Resistance Training No   Education Exercise safety, Signs/Symptoms to report, RPE scale, Equipment orientation   Target Goal(s) Individual exercise RX, Aerobic activity 30 + minutes/day  5 days/week   Stages of Change Maintenance   [facility] Score 19   PHQ 9 Score 4   Interventions No intervention indicated   Currently Taking Psychotropic Meds No   Medication Changes No   Education Impact self care behaviors on health   Target Goal(s) Engages in self-care behaviors   Uses Stress Mgmt Techniques Yes   Stages of Change Maintenance   Diabetes No   Date Lipids Drawn 09/04/20   Total 156   HDL 42   LDL 84   Triglycerides 150   Lipid Med(s) Lipitor   Lipid Med Change(s) No   Weight  89.4 kg (197 lb)   Height  5' 8\" (1.727 m)   BMI 30.02   Waist Circumference  43   Alcohol Weekly   Type beer   Amount 1-2   Rate Your Plate Total Score 54   Dietitian Consult No   Nurse/Patient Discussion Yes   Nutrition Class Yes   Diabetes Education Referral No   Lipid Clinic Referral No   Weight Management Referral No   Education Low fat & cholesterol diet, Carb-controlled diet, Low sodium diet, Healthy eating   Target Goals LDL-C less than 70 for high risk, BMI less than 25, Waist size less than 40 inches males and less than 35 inches for females, Weight loss of 5-10%   Learning Barrier Ready to learn   Education Schedule Given Yes   Education Risk factors, CAD, Med Compliance, Signs/Symptoms of Angina   Hypertension Yes   Hypertension Controlled Yes   Is BP WDL?  Yes   Med(s) Change No   BP Meds Lisinopril   Target Goals Medication compliance, Understand target guidelines for lipids, Understand target guidelines for B/P, Risk factors   Exercise     CR PHASE II from 8/26/2021 in [facility]   Any problems changes since your last visit Denies   Any symptoms while exercising denies   Psychosocial/Stress Level 0   Resting EKG rhythm SR w/rare PVCs   Tobacco Use None   ITP Next Review Date 09/05/21   Visit Number/Total Visits 72/72   On Call Medical Director Immediately Available Kailash   Target Heart Rate(Range)    Resting HR 64   Resting /63   Recovery HR 67   Recovery /65   Weight 89.4 kg (197 lb)   Exercise EKG Rhythm SR/ST w/occ PVCs   Exercise Duration 60   Peak    Peak RPE 13   Peak Mets 5.5   Asymptomatic yes   Total Minutes 70

## 2021-08-27 ENCOUNTER — HOSPITAL ENCOUNTER (OUTPATIENT)
Dept: CARDIAC REHAB | Age: 76
End: 2021-08-27
Payer: MEDICARE

## 2022-04-15 RX ORDER — ATORVASTATIN CALCIUM 80 MG/1
80 TABLET, FILM COATED ORAL
COMMUNITY

## 2022-04-15 RX ORDER — SPIRONOLACTONE 25 MG/1
25 TABLET ORAL DAILY
COMMUNITY
Start: 2021-10-18

## 2022-04-15 RX ORDER — FUROSEMIDE 20 MG/1
20 TABLET ORAL DAILY
COMMUNITY
Start: 2022-03-30

## 2022-04-15 RX ORDER — CARVEDILOL 3.12 MG/1
3.12 TABLET ORAL 2 TIMES DAILY
COMMUNITY

## 2022-04-15 RX ORDER — LOSARTAN POTASSIUM 25 MG/1
25 TABLET ORAL DAILY
COMMUNITY
Start: 2021-10-18

## 2022-04-15 NOTE — PERIOP NOTES
PRE-SURGICAL INSTRUCTIONS        Patient's Name:  Faith Zambrano      Today's Date:  4/15/2022            Covid Testing Date and Time:    Surgery Date:  4/19/2022                1. Do NOT eat or drink anything, including candy, gum, or ice chips after midnight on 04/18, unless you have specific instructions from your surgeon or anesthesia provider to do so.  2. You may brush your teeth before coming to the hospital.  3. No smoking 24 hours prior to the day of surgery. 4. No alcohol 24 hours prior to the day of surgery. 5. No recreational drugs for one week prior to the day of surgery. 6. Leave all valuables, including money/purse, at home. 7. Remove all jewelry, nail polish, acrylic nails, and makeup (including mascara); no lotions powders, deodorant, or perfume/cologne/after shave on the skin. 8. Follow instruction for Hibiclens washes and CHG wipes from surgeon's office. 9. Glasses/contact lenses and dentures may be worn to the hospital.  They will be removed prior to surgery. 10. Call your doctor if symptoms of a cold or illness develop within 24-48 hours prior to your surgery. 11.  If you are having an outpatient procedure, please make arrangements for a responsible ADULT TO [address] [place] [address] and stay with you for 24 hours after your surgery. 12. ONE VISITOR in the hospital at this time for outpatient procedures. Exceptions may be made for surgical admissions, per nursing unit guidelines      Special Instructions:      Bring list of CURRENT medications. Bring any pertinent legal medical records. Take these medications the morning of surgery with a sip of water:  Carvedilol and Losartan  Complete bowel prep per MD instructions. On the day of surgery, come in the main entrance of [facility]. Let the  at the desk know you are there for surgery. A staff member will come escort you to the surgical area on the second floor.     If you have any questions or concerns, please do not hesitate to call:     (Prior to the day of surgery) [facility] department:  [phone]   (Day of surgery) Pre-Op department:  [phone]    These surgical instructions were reviewed with Chely Barrett during the [facility] phone call.

## 2022-04-18 ENCOUNTER — ANESTHESIA EVENT (OUTPATIENT)
Dept: ENDOSCOPY | Age: 77
End: 2022-04-18
Payer: MEDICARE

## 2022-04-19 ENCOUNTER — HOSPITAL ENCOUNTER (OUTPATIENT)
Age: 77
Setting detail: OUTPATIENT SURGERY
Discharge: HOME | End: 2022-04-19
Attending: INTERNAL MEDICINE | Admitting: INTERNAL MEDICINE
Payer: MEDICARE

## 2022-04-19 ENCOUNTER — ANESTHESIA (OUTPATIENT)
Dept: ENDOSCOPY | Age: 77
End: 2022-04-19
Payer: MEDICARE

## 2022-04-19 VITALS
DIASTOLIC BLOOD PRESSURE: 72 MMHG | HEART RATE: 64 BPM | WEIGHT: 178 LBS | TEMPERATURE: 97.4 F | HEIGHT: 68 IN | SYSTOLIC BLOOD PRESSURE: 137 MMHG | BODY MASS INDEX: 26.98 KG/M2 | RESPIRATION RATE: 20 BRPM | OXYGEN SATURATION: 96 %

## 2022-04-19 PROCEDURE — 76060000032 HC ANESTHESIA 0.5 TO 1 HR: Performed by: INTERNAL MEDICINE

## 2022-04-19 PROCEDURE — 74011250636 HC RX REV CODE- 250/636: Performed by: NURSE ANESTHETIST, CERTIFIED REGISTERED

## 2022-04-19 PROCEDURE — 2709999900 HC NON-CHARGEABLE SUPPLY: Performed by: INTERNAL MEDICINE

## 2022-04-19 PROCEDURE — 76040000007: Performed by: INTERNAL MEDICINE

## 2022-04-19 PROCEDURE — 74011250637 HC RX REV CODE- 250/637: Performed by: NURSE ANESTHETIST, CERTIFIED REGISTERED

## 2022-04-19 PROCEDURE — 00812 ANES LWR INTST SCR COLSC: CPT | Performed by: ANESTHESIOLOGY

## 2022-04-19 PROCEDURE — 99100 ANES PT EXTEME AGE<1 YR&>70: CPT | Performed by: ANESTHESIOLOGY

## 2022-04-19 PROCEDURE — 99100 ANES PT EXTEME AGE<1 YR&>70: CPT | Performed by: NURSE ANESTHETIST, CERTIFIED REGISTERED

## 2022-04-19 PROCEDURE — 77030008565 HC TBNG SUC IRR ERBE -B: Performed by: INTERNAL MEDICINE

## 2022-04-19 PROCEDURE — 00812 ANES LWR INTST SCR COLSC: CPT | Performed by: NURSE ANESTHETIST, CERTIFIED REGISTERED

## 2022-04-19 RX ORDER — FAMOTIDINE 20 MG/1
20 TABLET, FILM COATED ORAL ONCE
Status: COMPLETED | OUTPATIENT
Start: 2022-04-19 | End: 2022-04-19

## 2022-04-19 RX ORDER — PROPOFOL 10 MG/ML
VIAL (ML) INTRAVENOUS AS NEEDED
Status: DISCONTINUED | OUTPATIENT
Start: 2022-04-19 | End: 2022-04-19 | Stop reason: HOSPADM

## 2022-04-19 RX ORDER — SODIUM CHLORIDE, SODIUM LACTATE, POTASSIUM CHLORIDE, CALCIUM CHLORIDE 600; 310; 30; 20 MG/100ML; MG/100ML; MG/100ML; MG/100ML
25 INJECTION, SOLUTION INTRAVENOUS CONTINUOUS
Status: DISCONTINUED | OUTPATIENT
Start: 2022-04-19 | End: 2022-04-19 | Stop reason: HOSPADM

## 2022-04-19 RX ORDER — SODIUM CHLORIDE 0.9 % (FLUSH) 0.9 %
5-40 SYRINGE (ML) INJECTION AS NEEDED
Status: CANCELLED | OUTPATIENT
Start: 2022-04-19

## 2022-04-19 RX ORDER — SODIUM CHLORIDE 0.9 % (FLUSH) 0.9 %
5-40 SYRINGE (ML) INJECTION EVERY 8 HOURS
Status: CANCELLED | OUTPATIENT
Start: 2022-04-19

## 2022-04-19 RX ADMIN — PROPOFOL 25 MG: 10 INJECTION, EMULSION INTRAVENOUS at 10:39

## 2022-04-19 RX ADMIN — PROPOFOL 100 MG: 10 INJECTION, EMULSION INTRAVENOUS at 10:18

## 2022-04-19 RX ADMIN — SODIUM CHLORIDE, SODIUM LACTATE, POTASSIUM CHLORIDE, AND CALCIUM CHLORIDE 25 ML/HR: 600; 310; 30; 20 INJECTION, SOLUTION INTRAVENOUS at 10:01

## 2022-04-19 RX ADMIN — FAMOTIDINE 20 MG: 20 TABLET ORAL at 10:01

## 2022-04-19 RX ADMIN — PROPOFOL 25 MG: 10 INJECTION, EMULSION INTRAVENOUS at 10:21

## 2022-04-19 RX ADMIN — PROPOFOL 25 MG: 10 INJECTION, EMULSION INTRAVENOUS at 10:24

## 2022-04-19 RX ADMIN — PROPOFOL 25 MG: 10 INJECTION, EMULSION INTRAVENOUS at 10:32

## 2022-04-19 RX ADMIN — PROPOFOL 25 MG: 10 INJECTION, EMULSION INTRAVENOUS at 10:28

## 2022-04-19 NOTE — H&P
[facility]  [phone]    Gastroenterology pre op History and Physical     Impression:   1. High risk colon cancer screening/Colon polyp surveillance exam      Plan:     1. Colonoscopy    Addendum: All lab tests orders pertaining to the procedure have been ordered by the anesthesia personnel and results will be addressed by the anesthesia team      Chief Complaint: high risk colon cancer screening exam.      HPI:  Herbert Hoffman is a 68 y.o. male who is being seen on consult for high risk colon cancer screening with colonoscopy. There is a previous history of colon polyps, and the patient returns for a surveillence exam    PMH:   Past Medical History:   Diagnosis Date    Atrial fibrillation ([facility]     Post CABG.  Coumadin stopped 3/22/2022    High cholesterol     Hyperlipidemia     Hypertension     NSTEMI (non-ST elevated myocardial infarction) ([facility] 2020    Restless leg syndrome     Sleep apnea     No CPAP    [facility]     Thromboembolus (HCC)        PSH:   Past Surgical History:   Procedure Laterality Date    HX CATARACT REMOVAL Bilateral     HX COLONOSCOPY      [place] [facility]IN, [facility]       Social HX:   Social History     Socioeconomic History    Marital status:      Spouse name: Not on file    Number of children: Not on file    Years of education: Not on file    Highest education level: Not on file   Occupational History    Not on file   Tobacco Use    Smoking status: Former Smoker     Packs/day: 2.00     Years: 20.00     Pack years: 40.00     Quit date:      Years since quittin.3    Smokeless tobacco: Never Used   Vaping Use    Vaping Use: Never used   Substance and Sexual Activity    Alcohol use: Not Currently     Alcohol/week: 2.0 standard drinks     Types: 2 Cans of beer per week    Drug use: Never    Sexual activity: Not on file   Other Topics Concern     Service Not Asked    Blood Transfusions Not Asked    Caffeine Concern Not Asked    Occupational Exposure Not Asked   Wes Mccray Hazards Not Asked    Sleep Concern Not Asked    Stress Concern Not Asked    Weight Concern Not Asked    Special Diet Not Asked    Back Care Not Asked    Exercise Not Asked    Bike Helmet Not Asked   [place] Not Asked    Self-Exams Not Asked   Social History Narrative    Not on file     Social Determinants of Health     Financial Resource Strain:     Difficulty of Paying Living Expenses: Not on file   Food Insecurity:     Worried About Running Out of Food in the Last Year: Not on file    Joaquin of Food in the Last Year: Not on file   Transportation Needs:     Lack of Transportation (Medical): Not on file    Lack of Transportation (Non-Medical): Not on file   Physical Activity:     Days of Exercise per Week: Not on file    Minutes of Exercise per Session: Not on file   Stress:     Feeling of Stress : Not on file   Social Connections:     Frequency of Communication with Friends and Family: Not on file    Frequency of Social Gatherings with Friends and Family: Not on file    Attends Islam Services: Not on file    Active Member of [address] [place] [address] or Organizations: Not on file    Attends Club or Organization Meetings: Not on file    Marital Status: Not on file   Intimate Partner Violence:     Fear of Current or Ex-Partner: Not on file    Emotionally Abused: Not on file    Physically Abused: Not on file    Sexually Abused: Not on file   Housing Stability:     Unable to Pay for Housing in the Last Year: Not on file    Number of [place] in the Last Year: Not on file    Unstable Housing in the Last Year: Not on file       FHX:   History reviewed. No pertinent family history. Allergy:   No Known Allergies    Home Medications:     Medications Prior to Admission   Medication Sig    furosemide (LASIX) 20 mg tablet Take 20 mg by mouth daily.  spironolactone (ALDACTONE) 25 mg tablet Take 25 mg by mouth daily.     losartan (COZAAR) 25 mg tablet Take 25 mg by mouth daily.  carvediloL (COREG) 3.125 mg tablet Take 3.125 mg by mouth two (2) times a day.  atorvastatin (LIPITOR) 80 mg tablet Take 80 mg by mouth nightly.  aspirin 81 mg chewable tablet Take 81 mg by mouth daily. Review of Systems:     Constitutional: No fevers, chills, weight loss, fatigue. Skin: No rashes, pruritis, jaundice, ulcerations, erythema. HENT: No headaches, nosebleeds, sinus pressure, rhinorrhea, sore throat. Eyes: No visual changes, blurred vision, eye pain, photophobia, jaundice. Cardiovascular: No chest pain, heart palpitations. Respiratory: No cough, SOB, wheezing, chest discomfort, orthopnea. Gastrointestinal: Neg unless noted otherwise in H&P   Genitourinary: No dysuria, bleeding, discharge, pyuria. Musculoskeletal: No weakness, arthralgias, wasting. Endo: No sweats. Heme: No bruising, easy bleeding. Allergies: As noted. Neurological: Cranial nerves intact. Alert and oriented. Gait not assessed. Psychiatric:  No anxiety, depression, hallucinations. Visit Vitals  Ht 5' 8\" (1.727 m)   Wt 90.7 kg (200 lb)   BMI 30.41 kg/m²       Physical Assessment:     constitutional: appearance: well developed, well nourished, normal habitus, no deformities, in no acute distress. skin: inspection: no rashes, ulcers, icterus or other lesions; no clubbing or telangiectasias. palpation: no induration or subcutaneos nodules. eyes: inspection: normal conjunctivae and lids; no jaundice pupils: symmetrical, normoreactive to light, normal accommodation and size. ENMT: mouth: normal oral mucosa,lips and gums; good dentition. oropharynx: normal tongue, hard and soft palate; posterior pharynx without erithema, exudate or lesions. neck: thyroid: normal size, consistency and position; no masses or tenderness. respiratory: effort: normal chest excursion; no intercostal retraction or accessory muscle use. cardiovascular: abdominal aorta: normal size and position; no bruits. palpation: PMI of normal size and position; normal rhythm; no thrill or murmurs. abdominal: abdomen: normal consistency; no tenderness or masses. hernias: no hernias appreciated. liver: normal size and consistency. spleen: not palpable. rectal: hemoccult/guaiac: not performed. musculoskeletal: digits and nails: no clubbing, cyanosis, petechiae or other inflammatory conditions. gait: normal gait and station head and neck: normal range of motion; no pain, crepitation or contracture. spine/ribs/pelvis: normal range of motion; no pain, deformity or contracture. lymphatic: axilae: not palpable. groin: not palpable. neck: within normal limits. other: not palpable. neurologic: cranial nerves: II-XII normal.   psychiatric: judgement/insight: within normal limits. memory: within normal limits for recent and remote events. mood and affect: no evidence of depression, anxiety or agitation. orientation: oriented to time, space and person. Basic Metabolic Profile   No results for input(s): NA, K, CL, CO2, BUN, GLU, CA, MG, PHOS in the last 72 hours. No lab exists for component: CREAT      CBC w/Diff    No results for input(s): WBC, RBC, HGB, HCT, MCV, MCH, MCHC, RDW, PLT, HGBEXT, HCTEXT, PLTEXT in the last 72 hours. No lab exists for component: MPV No results for input(s): GRANS, LYMPH, EOS, PRO, MYELO, METAS, BLAST in the last 72 hours. No lab exists for component: MONO, BASO     Hepatic Function   No results for input(s): ALB, TP, TBILI, AP, AML, LPSE in the last 72 hours. No lab exists for component: DBILI, GPT, SGOT       Flores Stewart MD  [facility], [address] [place] [address]  [email or web address]

## 2022-04-19 NOTE — ANESTHESIA PREPROCEDURE EVALUATION
Anesthetic History   No history of anesthetic complications            Review of Systems / Medical History  Patient summary reviewed    Pulmonary        Sleep apnea  Smoker         Neuro/Psych       CVA  TIA     Cardiovascular    Hypertension        Dysrhythmias   Past MI, CAD and CABG    Exercise tolerance: >4 METS     GI/Hepatic/Renal  Within defined limits              Endo/Other        Morbid obesity     Other Findings              Physical Exam    Airway  Mallampati: III  TM Distance: 4 - 6 cm  Neck ROM: short neck   Mouth opening: Normal     Cardiovascular  Regular rate and rhythm,  S1 and S2 normal,  no murmur, click, rub, or gallop  Rhythm: regular  Rate: normal         Dental    Dentition: Full upper dentures     Pulmonary  Breath sounds clear to auscultation               Abdominal  GI exam deferred       Other Findings            Anesthetic Plan    ASA: 3  Anesthesia type: MAC          Induction: Intravenous  Anesthetic plan and risks discussed with: Patient

## 2022-04-19 NOTE — ANESTHESIA POSTPROCEDURE EVALUATION
Procedure(s):  COLONOSCOPY. MAC    Anesthesia Post Evaluation      Multimodal analgesia: multimodal analgesia used between 6 hours prior to anesthesia start to PACU discharge  Patient location during evaluation: bedside  Patient participation: complete - patient participated  Level of consciousness: awake  Pain management: adequate  Airway patency: patent  Anesthetic complications: no  Cardiovascular status: stable  Respiratory status: acceptable  Hydration status: acceptable  Post anesthesia nausea and vomiting:  controlled  Final Post Anesthesia Temperature Assessment:  Normothermia (36.0-37.5 degrees C)      INITIAL Post-op Vital signs:   Vitals Value Taken Time   BP 97/45 04/19/22 1100   Temp 36.2 °C (97.1 °F) 04/19/22 1055   Pulse 59 04/19/22 1103   Resp 11 04/19/22 1103   SpO2 99 % 04/19/22 1103   Vitals shown include unvalidated device data.

## 2022-04-19 NOTE — DISCHARGE INSTRUCTIONS
Patient Education        Colonoscopy: What to Expect at Home  Your Recovery  After a colonoscopy, you'll stay at the clinic until you wake up. Then you can go home. But you'll need to arrange for a ride. Your doctor will tell you when you can eat and do your other usual activities. Your doctor will talk to you about when you'll need your next colonoscopy. Your doctor can help you decide how often you need to be checked. This will depend on the results of your test and your risk for colorectal cancer. After the test, you may be bloated or have gas pains. You may need to pass gas. If a biopsy was done or a polyp was removed, you may have streaks of blood in your stool (feces) for a few days. Problems such as heavy rectal bleeding may not occur until several weeks after the test. This isn't common. But it can happen after polyps are removed. This care sheet gives you a general idea about how long it will take for you to recover. But each person recovers at a different pace. Follow the steps below to get better as quickly as possible. How can you care for yourself at home? Activity    · Rest when you feel tired.     · You can do your normal activities when it feels okay to do so. Diet    · Follow your doctor's directions for eating.     · Unless your doctor has told you not to, drink plenty of fluids. This helps to replace the fluids that were lost during the colon prep.     · Do not drink alcohol. Medicines    · Your doctor will tell you if and when you can restart your medicines. You will also be given instructions about taking any new medicines.     · If you take aspirin or some other blood thinner, ask your doctor if and when to start taking it again. Make sure that you understand exactly what your doctor wants you to do.     · If polyps were removed or a biopsy was done during the test, your doctor may tell you not to take aspirin or other anti-inflammatory medicines for a few days.  These include ibuprofen (Advil, Motrin) and naproxen (Aleve). Other instructions    · For your safety, do not drive or operate machinery until the medicine wears off and you can think clearly. Your doctor may tell you not to drive or operate machinery until the day after your test.     · Do not sign legal documents or make major decisions until the medicine wears off and you can think clearly. The anesthesia can make it hard for you to fully understand what you are agreeing to. Follow-up care is a key part of your treatment and safety. Be sure to make and go to all appointments, and call your doctor if you are having problems. It's also a good idea to know your test results and keep a list of the medicines you take. When should you call for help? Call 911 anytime you think you may need emergency care. For example, call if:    · You passed out (lost consciousness).     · You pass maroon or bloody stools.     · You have trouble breathing. Call your doctor now or seek immediate medical care if:    · You have pain that does not get better after you take pain medicine.     · You are sick to your stomach or cannot drink fluids.     · You have new or worse belly pain.     · You have blood in your stools.     · You have a fever.     · You cannot pass stools or gas. Watch closely for changes in your health, and be sure to contact your doctor if you have any problems. Where can you learn more? Go to http://www.gray.com/  Enter E264 in the search box to learn more about \"Colonoscopy: What to Expect at Home. \"  Current as of: September 8, 2021               Content Version: 13.2  © 0936-6746 [employer]. Care instructions adapted under license by [employer] (which disclaims liability or warranty for this information).  If you have questions about a medical condition or this instruction, always ask your healthcare professional. [employer] disclaims any warranty or liability for your use of this information. Patient Education        Hemorrhoids: Care Instructions  Overview     Hemorrhoids are swollen veins that develop in the anal canal. Bleeding during bowel movements, itching, and rectal pain are the most common symptoms. Hemorrhoids can be uncomfortable at times, but rarely are they a serious problem. Most of the time, you can treat them with simple changes to your diet and bowel habits. These changes include eating more fiber and not straining to pass stools. Most hemorrhoids don't need surgery or other treatment unless they are very large and painful or bleed a lot. Follow-up care is a key part of your treatment and safety. Be sure to make and go to all appointments, and call your doctor if you are having problems. It's also a good idea to know your test results and keep a list of the medicines you take. How can you care for yourself at home? · Sit in a few inches of warm water (sitz bath) 3 times a day and after bowel movements. The warm water helps with pain and itching. · Put ice on your anal area several times a day for 10 minutes at a time. Put a thin cloth between the ice and your skin. Follow this by placing a warm, wet towel on the area for another 10 to 20 minutes. · Take pain medicines exactly as directed. ? If the doctor gave you a prescription medicine for pain, take it as prescribed. ? If you are not taking a prescription pain medicine, ask your doctor if you can take an over-the-counter medicine. · Keep the anal area clean, but be gentle. Use water and a fragrance-free soap, or use baby wipes or medicated pads such as Tucks. · Wear cotton underwear and loose clothing to decrease moisture in the anal area. · Eat more fiber. Include foods such as whole-grain breads and cereals, raw vegetables, raw and dried fruits, and beans. · Drink plenty of fluids.  If you have kidney, heart, or liver disease and have to limit fluids, talk with your doctor before you increase the amount of fluids you drink. · Use a stool softener that contains bran or psyllium. You can save money by buying bran or psyllium (available in bulk at most health food stores) and sprinkling it on foods or stirring it into fruit juice. Or you can use a product such as Metamucil or Hydrocil. · Practice healthy bowel habits. ? Go to the bathroom as soon as you have the urge. ? Avoid straining to pass stools. Relax and give yourself time to let things happen naturally. ? Do not hold your breath while passing stools. ? Do not read while sitting on the toilet. Get off the toilet as soon as you have finished. · Take your medicines exactly as prescribed. Call your doctor if you think you are having a problem with your medicine. When should you call for help? Call 911 anytime you think you may need emergency care. For example, call if:    · You pass maroon or very bloody stools. Call your doctor now or seek immediate medical care if:    · You have increased pain.     · You have increased bleeding. Watch closely for changes in your health, and be sure to contact your doctor if:    · Your symptoms have not improved after 3 or 4 days. Where can you learn more? Go to http://www.[employer].com/  Enter F228 in the search box to learn more about \"Hemorrhoids: Care Instructions. \"  Current as of: September 8, 2021               Content Version: 13.2  © 2006-2022 [employer]. Care instructions adapted under license by [employer] (which disclaims liability or warranty for this information). If you have questions about a medical condition or this instruction, always ask your healthcare professional. [place] [address] any warranty or liability for your use of this information. Patient Education        High-Fiber Diet: Care Instructions  Overview     A high-fiber diet may help you relieve constipation and feel less bloated.   Your doctor and dietitian will help you make a high-fiber eating plan based on your personal needs. The plan will include the things you like to eat. It will also make sure that you get 25 to 35 grams of fiber a day. Before you make changes to the way you eat, be sure to talk with your doctor or dietitian. Follow-up care is a key part of your treatment and safety. Be sure to make and go to all appointments, and call your doctor if you are having problems. It's also a good idea to know your test results and keep a list of the medicines you take. How can you care for yourself at home? · You can increase how much fiber you get if you eat more of certain foods. These foods include:  ? Whole-grain breads and cereals. ? Fruits, such as pears, apples, and peaches. Eat the skins and peels if you can.  ? Vegetables, such as broccoli, cabbage, spinach, carrots, asparagus, and squash. ? Starchy vegetables. These include potatoes with skins, kidney beans, and lima beans. · Take a fiber supplement every day if your doctor recommends it. Examples are Benefiber, Citrucel, FiberCon, and Metamucil. Ask your doctor how much to take. · Drink plenty of fluids. If you have kidney, heart, or liver disease and have to limit fluids, talk with your doctor before you increase the amount of fluids you drink. Where can you learn more? Go to http://www.gray.com/  Enter H632 in the search box to learn more about \"High-Fiber Diet: Care Instructions. \"  Current as of: September 8, 2021               Content Version: 13.2  © 0822-0451 [employer]. Care instructions adapted under license by [employer] (which disclaims liability or warranty for this information). If you have questions about a medical condition or this instruction, always ask your healthcare professional. [place] [address] any warranty or liability for your use of this information.          DISCHARGE SUMMARY from Nurse    PATIENT INSTRUCTIONS:    After general anesthesia or intravenous sedation, for 24 hours or while taking prescription Narcotics:  · Limit your activities  · Do not drive and operate hazardous machinery  · Do not make important personal or business decisions  · Do  not drink alcoholic beverages  · If you have not urinated within 8 hours after discharge, please contact your surgeon on call. Report the following to your surgeon:  · Excessive pain, swelling, redness or odor of or around the surgical area  · Temperature over 100.5  · Nausea and vomiting lasting longer than 4 hours or if unable to take medications  · Any signs of decreased circulation or nerve impairment to extremity: change in color, persistent  numbness, tingling, coldness or increase pain  · Any questions        These are general instructions for a healthy lifestyle:    No smoking/ No tobacco products/ Avoid exposure to second hand smoke  Surgeon General's Warning:  Quitting smoking now greatly reduces serious risk to your health. Obesity, smoking, and sedentary lifestyle greatly increases your risk for illness    A healthy diet, regular physical exercise & weight monitoring are important for maintaining a healthy lifestyle    You may be retaining fluid if you have a history of heart failure or if you experience any of the following symptoms:  Weight gain of 3 pounds or more overnight or 5 pounds in a week, increased swelling in our hands or feet or shortness of breath while lying flat in bed. Please call your doctor as soon as you notice any of these symptoms; do not wait until your next office visit. The discharge information has been reviewed with the patient. The patient verbalized understanding.   Discharge medications reviewed with the patient and appropriate educational materials and side effects teaching were provided.   ___________________________________________________________________________________________________________________________________

## 2022-04-19 NOTE — PROGRESS NOTES
[email or web address]  [place], [address]      Brief Procedure Note    Bradley Carter  1945  [id number]    Date of Procedure: 4/19/2022    Preoperative diagnosis: BMI 32.0 - 32.9, adult:  Z68.32  Personal history of colon polyps:  V12.72 - Z86.010  Current use of long term anticoagulation:  Z79.01    Postoperative diagnosis: hemorrhoids otherwise normal colon    Type of Anesthesia: MAC (Monitored anesthesia care)    Description of findings: same as post op dx    Procedure: Procedure(s):  COLONOSCOPY    :  Dr. Vonda Louis MD    Assistant(s): Endoscopy Technician-1: Sudeep Braden  Endoscopy RN-1: Nancy BROOKS RN    Devices/implants/grafts/tissues/prosthesis: None    EBL:None    Specimens: * No specimens in log *    Findings: See printed and scanned procedure note    Complications: None    Dr. Vonda Louis MD  4/19/2022  10:48 AM

## 2024-10-24 ENCOUNTER — HOSPITAL ENCOUNTER (OUTPATIENT)
Facility: HOSPITAL | Age: 79
Discharge: HOME | End: 2024-10-27
Payer: MEDICARE

## 2024-10-24 LAB
FOLATE SERPL-MCNC: 5.5 NG/ML (ref 3.1–17.5)
TSH SERPL DL<=0.05 MIU/L-ACNC: 0.98 UIU/ML (ref 0.36–3.74)
VIT B12 SERPL-MCNC: 172 PG/ML (ref 211–911)

## 2024-10-24 PROCEDURE — 36415 COLL VENOUS BLD VENIPUNCTURE: CPT

## 2024-10-24 PROCEDURE — 84443 ASSAY THYROID STIM HORMONE: CPT

## 2024-10-24 PROCEDURE — 82607 VITAMIN B-12: CPT

## 2024-10-24 PROCEDURE — 82746 ASSAY OF FOLIC ACID SERUM: CPT

## 2024-10-26 ENCOUNTER — HOSPITAL ENCOUNTER (OUTPATIENT)
Facility: HOSPITAL | Age: 79
Discharge: HOME | End: 2024-10-29
Payer: MEDICARE

## 2024-10-26 DIAGNOSIS — F02.80 ALZHEIMER'S DISEASE WITH LATE ONSET (HCC): ICD-10-CM

## 2024-10-26 DIAGNOSIS — G30.1 ALZHEIMER'S DISEASE WITH LATE ONSET (HCC): ICD-10-CM

## 2024-10-26 PROCEDURE — 70551 MRI BRAIN STEM W/O DYE: CPT

## 2025-01-15 ENCOUNTER — TRANSCRIBE ORDERS (OUTPATIENT)
Facility: HOSPITAL | Age: 80
End: 2025-01-15

## 2025-01-15 DIAGNOSIS — H90.A32 MIXED CONDUCTIVE AND SENSORINEURAL HEARING LOSS OF LEFT EAR WITH RESTRICTED HEARING OF RIGHT EAR: Primary | ICD-10-CM

## 2025-01-15 DIAGNOSIS — H93.19 TINNITUS, UNSPECIFIED LATERALITY: ICD-10-CM

## 2025-01-27 ENCOUNTER — HOSPITAL ENCOUNTER (OUTPATIENT)
Facility: HOSPITAL | Age: 80
Discharge: HOME | End: 2025-01-30
Payer: MEDICARE

## 2025-01-27 DIAGNOSIS — H93.19 TINNITUS, UNSPECIFIED LATERALITY: ICD-10-CM

## 2025-01-27 DIAGNOSIS — H90.A32 MIXED CONDUCTIVE AND SENSORINEURAL HEARING LOSS OF LEFT EAR WITH RESTRICTED HEARING OF RIGHT EAR: ICD-10-CM

## 2025-01-27 PROCEDURE — 82565 ASSAY OF CREATININE: CPT

## 2025-01-27 PROCEDURE — 70553 MRI BRAIN STEM W/O & W/DYE: CPT

## 2025-01-27 PROCEDURE — A9577 INJ MULTIHANCE: HCPCS

## 2025-01-27 PROCEDURE — 6360000004 HC RX CONTRAST MEDICATION

## 2025-01-27 RX ADMIN — GADOBENATE DIMEGLUMINE 20 ML: 529 INJECTION, SOLUTION INTRAVENOUS at 12:46

## 2025-01-28 LAB — CREAT UR-MCNC: 1.3 MG/DL (ref 0.6–1.3)

## 2025-06-25 ENCOUNTER — HOSPITAL ENCOUNTER (EMERGENCY)
Facility: HOSPITAL | Age: 80
Discharge: HOME | End: 2025-06-25
Attending: EMERGENCY MEDICINE
Payer: MEDICARE

## 2025-06-25 VITALS
WEIGHT: 191.6 LBS | OXYGEN SATURATION: 96 % | RESPIRATION RATE: 20 BRPM | TEMPERATURE: 98 F | HEART RATE: 70 BPM | DIASTOLIC BLOOD PRESSURE: 61 MMHG | SYSTOLIC BLOOD PRESSURE: 113 MMHG | BODY MASS INDEX: 29.13 KG/M2

## 2025-06-25 DIAGNOSIS — R33.9 URINARY RETENTION: Primary | ICD-10-CM

## 2025-06-25 PROCEDURE — 99283 EMERGENCY DEPT VISIT LOW MDM: CPT

## 2025-06-25 PROCEDURE — 51702 INSERT TEMP BLADDER CATH: CPT

## 2025-06-25 ASSESSMENT — LIFESTYLE VARIABLES
HOW MANY STANDARD DRINKS CONTAINING ALCOHOL DO YOU HAVE ON A TYPICAL DAY: PATIENT DOES NOT DRINK
HOW OFTEN DO YOU HAVE A DRINK CONTAINING ALCOHOL: NEVER

## 2025-06-25 ASSESSMENT — PAIN - FUNCTIONAL ASSESSMENT: PAIN_FUNCTIONAL_ASSESSMENT: NONE - DENIES PAIN

## 2025-06-26 NOTE — ED PROVIDER NOTES
none  Procedures        CRITICAL CARE TIME       ED IMPRESSION     1. Urinary retention          DISPOSITION/PLAN   DISPOSITION Decision To Discharge 06/25/2025 10:36:23 PM   DISPOSITION CONDITION Stable         Discharge Note: The patient is stable for discharge home. The signs, symptoms, diagnosis, and discharge instructions have been discussed, understanding conveyed, and agreed upon. The patient is to follow up as recommended or return to ER should their symptoms worsen.      PATIENT REFERRED TO:  your urologist    Call in 1 day          DISCHARGE MEDICATIONS:     Medication List        ASK your doctor about these medications      aspirin 81 MG chewable tablet     atorvastatin 80 MG tablet  Commonly known as: LIPITOR     carvedilol 3.125 MG tablet  Commonly known as: COREG     furosemide 20 MG tablet  Commonly known as: LASIX     losartan 25 MG tablet  Commonly known as: COZAAR     spironolactone 25 MG tablet  Commonly known as: ALDACTONE                DISCONTINUED MEDICATIONS:  Discharge Medication List as of 6/25/2025 10:57 PM          I am the Primary Clinician of Record. Kenan cavanaugh MD (electronically signed)    (Please note that parts of this dictation were completed with voice recognition software. Quite often unanticipated grammatical, syntax, homophones, and other interpretive errors are inadvertently transcribed by the computer software. Please disregards these errors. Please excuse any errors that have escaped final proofreading.)     Kenan Cavanaugh MD  06/26/25 0638

## 2025-06-26 NOTE — ED TRIAGE NOTES
Pt reports urinary cathter was placed for retention, pt was trailed without catheter today and had it replaced, upon getting home pt states he stood up out of car and catheter fell out aprox 18:00

## (undated) DEVICE — ENDOSCOPY PUMP TUBING/ CAP SET: Brand: ERBE

## (undated) DEVICE — FLUFF AND POLYMER UNDERPAD,EXTRA HEAVY: Brand: WINGS

## (undated) DEVICE — SYRINGE MED 25GA 3ML L5/8IN SUBQ PLAS W/ DETACH NDL SFTY

## (undated) DEVICE — CANNULA NSL AD TBNG L14FT STD PVC O2 CRV CONN NONFLARED NSL

## (undated) DEVICE — MEDI-VAC NON-CONDUCTIVE SUCTION TUBING: Brand: CARDINAL HEALTH

## (undated) DEVICE — SOLUTION IRRIG 1000ML H2O STRL BLT

## (undated) DEVICE — SYR 20ML LL STRL LF --

## (undated) DEVICE — MEDI-VAC SUCTION HIGH CAPACITY: Brand: CARDINAL HEALTH

## (undated) DEVICE — SYR 50ML SLIP TIP NSAF LF STRL --

## (undated) DEVICE — CATHETER SUCT TR FL TIP 14FR W/ O CTRL

## (undated) DEVICE — GAUZE,SPONGE,4"X4",16PLY,STRL,LF,10/TRAY: Brand: MEDLINE

## (undated) DEVICE — GOWN ISOL IMPERV UNIV, DISP, OPEN BACK, BLUE --

## (undated) DEVICE — SYR 10ML LUER LOK 1/5ML GRAD --

## (undated) DEVICE — CANNULA ORIG TL CLR W FOAM CUSHIONS AND 14FT SUPL TB 3 CHN

## (undated) DEVICE — FLEX ADVANTAGE 3000CC: Brand: FLEX ADVANTAGE